# Patient Record
Sex: MALE | Race: WHITE | Employment: STUDENT | URBAN - METROPOLITAN AREA
[De-identification: names, ages, dates, MRNs, and addresses within clinical notes are randomized per-mention and may not be internally consistent; named-entity substitution may affect disease eponyms.]

---

## 2017-03-28 ENCOUNTER — OFFICE VISIT (OUTPATIENT)
Dept: PLASTIC SURGERY | Facility: CLINIC | Age: 22
End: 2017-03-28

## 2017-03-28 VITALS
SYSTOLIC BLOOD PRESSURE: 119 MMHG | TEMPERATURE: 98.4 F | BODY MASS INDEX: 25.86 KG/M2 | HEIGHT: 65 IN | DIASTOLIC BLOOD PRESSURE: 65 MMHG | WEIGHT: 155.2 LBS | HEART RATE: 90 BPM | OXYGEN SATURATION: 99 %

## 2017-03-28 DIAGNOSIS — F64.0 GENDER DYSPHORIA IN ADOLESCENT AND ADULT: Primary | ICD-10-CM

## 2017-03-28 RX ORDER — METHYLPHENIDATE HYDROCHLORIDE 27 MG/1
TABLET ORAL
Status: ON HOLD | COMMUNITY
Start: 2017-01-12 | End: 2018-01-03

## 2017-03-28 RX ORDER — TESTOSTERONE CYPIONATE 200 MG/ML
0.4 INJECTION, SOLUTION INTRAMUSCULAR WEEKLY
COMMUNITY
Start: 2015-08-11

## 2017-03-28 ASSESSMENT — PAIN SCALES - GENERAL: PAINLEVEL: NO PAIN (0)

## 2017-03-28 NOTE — NURSING NOTE
"/65 (BP Location: Left arm, Patient Position: Chair, Cuff Size: Adult Regular)  Pulse 90  Temp 98.4  F (36.9  C)  Ht 5' 5\"  Wt 155 lb 3.2 oz  SpO2 99%  BMI 25.83 kg/m2    Rhina Kelly LPN        "

## 2017-03-28 NOTE — LETTER
"3/28/2017       RE: Inderjit Rider  [address]   [facility] [id number]     Dear Colleague,    Thank you for referring your patient, Inderjit Rider, to the [facility]ON[facility]GERY at [facility]. Please see a copy of my visit note below.    PLASTICS NEW    This is a 21 year old biological female transitioning to male here to discuss possible top surgery.Inderjit prefers male pronouns.  He was referred by previous patients since he can't afford to pay out of pocket.     He has been on testosterone for 3 years and established care with Dr Alysha Corbin, having recently moved here from [facility] in December 2016.   He was seeing his therapist Johnathan in [facility] for about 1.5 years, but this was about a year ago.   He's been \"out\", living his chosen gender role for at least 6 years.  He binds with [employer] for the past 4-5 years.  He denies any breast problems.     PMH: gender dysphoria. Anxiety - no meds. Depression  - on Lexapro, stable. Followed by psychiatrist and PMD. Denies diabetes, asthma, GERD. Has piercings and tattoos, but denies any healing/scarring or bleeding/clotting problems.     PSH: tonsils and adenoids.    FHX: no breast or ovarian cancer. Mom had a lumpectomy but presumably benign. Also has HTN and history of TIA's. Dad's health history unknown. PGM may have had some form of cancer. MGF - diabetets and HTN.     SHX: mom now supportive of transition. Grandma also supportive. Has a 9 year old sister. Currently full-time student at [employer] pursuing liberal arts (classes 2 days per week). Also works at [employer] - does all jobs when not at school. Works part-time in the box office at the [employer]   He lives with roommates who can help with postop cares. He has a boyfriend in [place].   Exercise - weights and running 2 times per week. Diet - vegetarian, some dairy, lots of pasta but trying to cut down on carbs. Admits to not much " "protein other than eggs. Non-smoker, non-drinker. Sleep - averages 4-5 hours per night due to high stress.     PE: 5'5\", 155 lbs. Biological female passes well as male. Breasts fairly symmetric IMFs but low on thorax due to binding. Breast volume slightly larger on right side. Minimal lateral thoracic rolls. Good pectoralis development, anterior chest contour somewhat rounded over manubrium. Fibrofatty tissues without masses on palpation. Benign axillary adenopathy bilateral. Elastic skin and grade 2 ptosis. Photos taken with written consent.    A/P: biologic female transitioning to male. Good candidate for gender affirming bilateral SQ mastectomies with nipple grafts. Will need preop mammogram.     Has Blue Plus insurance. Wants surgery during summer break. Will need to provide letter of support from a therapist, not sure if he will be able to reestablish with his previous therapist or need to see someone new in the [facility]. May also need documentation of stability of mental health diagnoses.     Once we have prior authorization and a surgical date, we will meet again for a preop visit to discuss possible risks and complications as well as periop cares and limitations.    Total time- 30 minutes, greater than half spent discussing surgical options and insurance issues.       Again, thank you for allowing me to participate in the care of your patient.      Sincerely,    Giulia Woods MD      "

## 2017-04-10 NOTE — PROGRESS NOTES
"PLASTICS NEW    This is a 21 year old biological female transitioning to male here to discuss possible top surgery.Inderjit prefers male pronouns.  He was referred by previous patients since he can't afford to pay out of pocket.     He has been on testosterone for 3 years and established care with Dr Alysha Corbin, having recently moved here from [facility] in December 2016.   He was seeing his therapist Johnathan in [facility] for about 1.5 years, but this was about a year ago.   He's been \"out\", living his chosen gender role for at least 6 years.  He binds with [employer] for the past 4-5 years.  He denies any breast problems.     PMH: gender dysphoria. Anxiety - no meds. Depression  - on Lexapro, stable. Followed by psychiatrist and PMD. Denies diabetes, asthma, GERD. Has piercings and tattoos, but denies any healing/scarring or bleeding/clotting problems.     PSH: tonsils and adenoids.    FHX: no breast or ovarian cancer. Mom had a lumpectomy but presumably benign. Also has HTN and history of TIA's. Dad's health history unknown. PGM may have had some form of cancer. MGF - diabetets and HTN.     SHX: mom now supportive of transition. Grandma also supportive. Has a 9 year old sister. Currently full-time student at [employer] pursuing liberal arts (classes 2 days per week). Also works at [employer] - does all jobs when not at school. 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   He lives with roommates who can help with postop cares. He has a boyfriend in [place].   Exercise - weights and running 2 times per week. Diet - vegetarian, some dairy, lots of pasta but trying to cut down on carbs. Admits to not much protein other than eggs. Non-smoker, non-drinker. Sleep - averages 4-5 hours per night due to high stress.     PE: 5'5\", 155 lbs. Biological female passes well as male. Breasts fairly symmetric IMFs but low on thorax due to binding. Breast volume slightly larger on right side. Minimal lateral thoracic rolls. Good " pectoralis development, anterior chest contour somewhat rounded over manubrium. Fibrofatty tissues without masses on palpation. Benign axillary adenopathy bilateral. Elastic skin and grade 2 ptosis. Photos taken with written consent.    A/P: biologic female transitioning to male. Good candidate for gender affirming bilateral SQ mastectomies with nipple grafts. Will need preop mammogram.     Has [employer] Plus insurance. Wants surgery during summer break. Will need to provide letter of support from a therapist, not sure if he will be able to reestablish with his previous therapist or need to see someone new in the [facility]. May also need documentation of stability of mental health diagnoses.     Once we have prior authorization and a surgical date, we will meet again for a preop visit to discuss possible risks and complications as well as periop cares and limitations.    Total time- 30 minutes, greater than half spent discussing surgical options and insurance issues.

## 2017-08-22 DIAGNOSIS — F64.0 GENDER DYSPHORIA IN ADOLESCENT AND ADULT: Primary | ICD-10-CM

## 2018-01-03 ENCOUNTER — ANESTHESIA (OUTPATIENT)
Dept: SURGERY | Facility: CLINIC | Age: 23
End: 2018-01-03
Payer: COMMERCIAL

## 2018-01-03 ENCOUNTER — HOSPITAL ENCOUNTER (OUTPATIENT)
Facility: CLINIC | Age: 23
Discharge: HOME | End: 2018-01-03
Attending: SURGERY | Admitting: SURGERY
Payer: COMMERCIAL

## 2018-01-03 ENCOUNTER — ANESTHESIA EVENT (OUTPATIENT)
Dept: SURGERY | Facility: CLINIC | Age: 23
End: 2018-01-03
Payer: COMMERCIAL

## 2018-01-03 VITALS
DIASTOLIC BLOOD PRESSURE: 75 MMHG | SYSTOLIC BLOOD PRESSURE: 130 MMHG | HEIGHT: 65 IN | WEIGHT: 161.38 LBS | BODY MASS INDEX: 26.89 KG/M2 | TEMPERATURE: 98.9 F | OXYGEN SATURATION: 99 % | RESPIRATION RATE: 16 BRPM

## 2018-01-03 DIAGNOSIS — F64.0 GENDER DYSPHORIA IN ADULT: Primary | ICD-10-CM

## 2018-01-03 LAB
GLUCOSE BLDC GLUCOMTR-MCNC: 97 MG/DL (ref 70–99)
HCG UR QL: NEGATIVE

## 2018-01-03 PROCEDURE — 71000014 ZZH RECOVERY PHASE 1 LEVEL 2 FIRST HR: Performed by: SURGERY

## 2018-01-03 PROCEDURE — 25000128 H RX IP 250 OP 636: Performed by: SURGERY

## 2018-01-03 PROCEDURE — 25000128 H RX IP 250 OP 636: Performed by: ANESTHESIOLOGY

## 2018-01-03 PROCEDURE — 88305 TISSUE EXAM BY PATHOLOGIST: CPT | Performed by: SURGERY

## 2018-01-03 PROCEDURE — 81025 URINE PREGNANCY TEST: CPT | Performed by: ANESTHESIOLOGY

## 2018-01-03 PROCEDURE — 71000027 ZZH RECOVERY PHASE 2 EACH 15 MINS: Performed by: SURGERY

## 2018-01-03 PROCEDURE — 25000125 ZZHC RX 250: Performed by: SURGERY

## 2018-01-03 PROCEDURE — 36000057 ZZH SURGERY LEVEL 3 1ST 30 MIN - UMMC: Performed by: SURGERY

## 2018-01-03 PROCEDURE — 71000015 ZZH RECOVERY PHASE 1 LEVEL 2 EA ADDTL HR: Performed by: SURGERY

## 2018-01-03 PROCEDURE — 25000566 ZZH SEVOFLURANE, EA 15 MIN: Performed by: SURGERY

## 2018-01-03 PROCEDURE — 82962 GLUCOSE BLOOD TEST: CPT

## 2018-01-03 PROCEDURE — 25000125 ZZHC RX 250: Performed by: NURSE ANESTHETIST, CERTIFIED REGISTERED

## 2018-01-03 PROCEDURE — 88305 TISSUE EXAM BY PATHOLOGIST: CPT | Mod: 26 | Performed by: SURGERY

## 2018-01-03 PROCEDURE — 25000128 H RX IP 250 OP 636: Performed by: NURSE ANESTHETIST, CERTIFIED REGISTERED

## 2018-01-03 PROCEDURE — 37000008 ZZH ANESTHESIA TECHNICAL FEE, 1ST 30 MIN: Performed by: SURGERY

## 2018-01-03 PROCEDURE — 27210794 ZZH OR GENERAL SUPPLY STERILE: Performed by: SURGERY

## 2018-01-03 PROCEDURE — 40000170 ZZH STATISTIC PRE-PROCEDURE ASSESSMENT II: Performed by: SURGERY

## 2018-01-03 PROCEDURE — 37000009 ZZH ANESTHESIA TECHNICAL FEE, EACH ADDTL 15 MIN: Performed by: SURGERY

## 2018-01-03 PROCEDURE — 27110038 ZZH RX 271: Performed by: SURGERY

## 2018-01-03 PROCEDURE — 36000059 ZZH SURGERY LEVEL 3 EA 15 ADDTL MIN UMMC: Performed by: SURGERY

## 2018-01-03 RX ORDER — BUPIVACAINE HYDROCHLORIDE 5 MG/ML
INJECTION, SOLUTION PERINEURAL PRN
Status: DISCONTINUED | OUTPATIENT
Start: 2018-01-03 | End: 2018-01-04 | Stop reason: HOSPADM

## 2018-01-03 RX ORDER — NALOXONE HYDROCHLORIDE 0.4 MG/ML
.1-.4 INJECTION, SOLUTION INTRAMUSCULAR; INTRAVENOUS; SUBCUTANEOUS
Status: DISCONTINUED | OUTPATIENT
Start: 2018-01-03 | End: 2018-01-04 | Stop reason: HOSPADM

## 2018-01-03 RX ORDER — ACETAMINOPHEN 325 MG/1
650 TABLET ORAL EVERY 4 HOURS PRN
Qty: 100 TABLET | Refills: 0 | COMMUNITY
Start: 2018-01-03

## 2018-01-03 RX ORDER — FENTANYL CITRATE 50 UG/ML
25-50 INJECTION, SOLUTION INTRAMUSCULAR; INTRAVENOUS EVERY 5 MIN PRN
Status: DISCONTINUED | OUTPATIENT
Start: 2018-01-03 | End: 2018-01-04 | Stop reason: HOSPADM

## 2018-01-03 RX ORDER — SODIUM CHLORIDE, SODIUM LACTATE, POTASSIUM CHLORIDE, CALCIUM CHLORIDE 600; 310; 30; 20 MG/100ML; MG/100ML; MG/100ML; MG/100ML
INJECTION, SOLUTION INTRAVENOUS CONTINUOUS
Status: DISCONTINUED | OUTPATIENT
Start: 2018-01-03 | End: 2018-01-03 | Stop reason: HOSPADM

## 2018-01-03 RX ORDER — CEFAZOLIN SODIUM 2 G/100ML
2 INJECTION, SOLUTION INTRAVENOUS
Status: COMPLETED | OUTPATIENT
Start: 2018-01-03 | End: 2018-01-03

## 2018-01-03 RX ORDER — MEPERIDINE HYDROCHLORIDE 25 MG/ML
12.5 INJECTION INTRAMUSCULAR; INTRAVENOUS; SUBCUTANEOUS
Status: DISCONTINUED | OUTPATIENT
Start: 2018-01-03 | End: 2018-01-04 | Stop reason: HOSPADM

## 2018-01-03 RX ORDER — LIDOCAINE HYDROCHLORIDE 20 MG/ML
INJECTION, SOLUTION INFILTRATION; PERINEURAL PRN
Status: DISCONTINUED | OUTPATIENT
Start: 2018-01-03 | End: 2018-01-03

## 2018-01-03 RX ORDER — PROPOFOL 10 MG/ML
INJECTION, EMULSION INTRAVENOUS PRN
Status: DISCONTINUED | OUTPATIENT
Start: 2018-01-03 | End: 2018-01-03

## 2018-01-03 RX ORDER — AMOXICILLIN 250 MG
1 CAPSULE ORAL 2 TIMES DAILY
Qty: 60 TABLET | Refills: 1 | Status: SHIPPED | OUTPATIENT
Start: 2018-01-03 | End: 2018-03-06

## 2018-01-03 RX ORDER — ONDANSETRON 2 MG/ML
INJECTION INTRAMUSCULAR; INTRAVENOUS PRN
Status: DISCONTINUED | OUTPATIENT
Start: 2018-01-03 | End: 2018-01-03

## 2018-01-03 RX ORDER — LIDOCAINE 40 MG/G
CREAM TOPICAL
Status: DISCONTINUED | OUTPATIENT
Start: 2018-01-03 | End: 2018-01-03 | Stop reason: HOSPADM

## 2018-01-03 RX ORDER — FENTANYL CITRATE 50 UG/ML
INJECTION, SOLUTION INTRAMUSCULAR; INTRAVENOUS PRN
Status: DISCONTINUED | OUTPATIENT
Start: 2018-01-03 | End: 2018-01-03

## 2018-01-03 RX ORDER — AZITHROMYCIN 250 MG/1
TABLET, FILM COATED ORAL
Qty: 6 TABLET | Refills: 0 | Status: SHIPPED | OUTPATIENT
Start: 2018-01-03 | End: 2018-03-06

## 2018-01-03 RX ORDER — NEOSTIGMINE METHYLSULFATE 1 MG/ML
VIAL (ML) INJECTION PRN
Status: DISCONTINUED | OUTPATIENT
Start: 2018-01-03 | End: 2018-01-03

## 2018-01-03 RX ORDER — SODIUM CHLORIDE, SODIUM LACTATE, POTASSIUM CHLORIDE, CALCIUM CHLORIDE 600; 310; 30; 20 MG/100ML; MG/100ML; MG/100ML; MG/100ML
INJECTION, SOLUTION INTRAVENOUS CONTINUOUS
Status: DISCONTINUED | OUTPATIENT
Start: 2018-01-03 | End: 2018-01-04 | Stop reason: HOSPADM

## 2018-01-03 RX ORDER — EPHEDRINE SULFATE 50 MG/ML
INJECTION, SOLUTION INTRAMUSCULAR; INTRAVENOUS; SUBCUTANEOUS PRN
Status: DISCONTINUED | OUTPATIENT
Start: 2018-01-03 | End: 2018-01-03

## 2018-01-03 RX ORDER — ONDANSETRON 2 MG/ML
4 INJECTION INTRAMUSCULAR; INTRAVENOUS EVERY 30 MIN PRN
Status: DISCONTINUED | OUTPATIENT
Start: 2018-01-03 | End: 2018-01-04 | Stop reason: HOSPADM

## 2018-01-03 RX ORDER — CEFAZOLIN SODIUM 1 G/3ML
1 INJECTION, POWDER, FOR SOLUTION INTRAMUSCULAR; INTRAVENOUS SEE ADMIN INSTRUCTIONS
Status: DISCONTINUED | OUTPATIENT
Start: 2018-01-03 | End: 2018-01-03 | Stop reason: HOSPADM

## 2018-01-03 RX ORDER — ONDANSETRON 4 MG/1
4-8 TABLET, ORALLY DISINTEGRATING ORAL EVERY 8 HOURS PRN
Qty: 20 TABLET | Refills: 1 | Status: SHIPPED | OUTPATIENT
Start: 2018-01-03 | End: 2018-03-06

## 2018-01-03 RX ORDER — PROPOFOL 10 MG/ML
INJECTION, EMULSION INTRAVENOUS CONTINUOUS PRN
Status: DISCONTINUED | OUTPATIENT
Start: 2018-01-03 | End: 2018-01-03

## 2018-01-03 RX ORDER — OXYCODONE HYDROCHLORIDE 5 MG/1
5-10 TABLET ORAL EVERY 4 HOURS PRN
Qty: 50 TABLET | Refills: 0 | Status: SHIPPED | OUTPATIENT
Start: 2018-01-03 | End: 2018-03-06

## 2018-01-03 RX ORDER — DEXAMETHASONE SODIUM PHOSPHATE 4 MG/ML
INJECTION, SOLUTION INTRA-ARTICULAR; INTRALESIONAL; INTRAMUSCULAR; INTRAVENOUS; SOFT TISSUE PRN
Status: DISCONTINUED | OUTPATIENT
Start: 2018-01-03 | End: 2018-01-03

## 2018-01-03 RX ORDER — GLYCOPYRROLATE 0.2 MG/ML
INJECTION, SOLUTION INTRAMUSCULAR; INTRAVENOUS PRN
Status: DISCONTINUED | OUTPATIENT
Start: 2018-01-03 | End: 2018-01-03

## 2018-01-03 RX ORDER — ONDANSETRON 4 MG/1
4 TABLET, ORALLY DISINTEGRATING ORAL EVERY 30 MIN PRN
Status: DISCONTINUED | OUTPATIENT
Start: 2018-01-03 | End: 2018-01-04 | Stop reason: HOSPADM

## 2018-01-03 RX ORDER — HYDROXYZINE HYDROCHLORIDE 25 MG/1
25-50 TABLET, FILM COATED ORAL EVERY 6 HOURS PRN
Qty: 60 TABLET | Refills: 1 | Status: SHIPPED | OUTPATIENT
Start: 2018-01-03 | End: 2018-03-06

## 2018-01-03 RX ADMIN — FENTANYL CITRATE 50 MCG: 50 INJECTION, SOLUTION INTRAMUSCULAR; INTRAVENOUS at 18:30

## 2018-01-03 RX ADMIN — ONDANSETRON 4 MG: 2 INJECTION INTRAMUSCULAR; INTRAVENOUS at 17:59

## 2018-01-03 RX ADMIN — SODIUM CHLORIDE, POTASSIUM CHLORIDE, SODIUM LACTATE AND CALCIUM CHLORIDE: 600; 310; 30; 20 INJECTION, SOLUTION INTRAVENOUS at 17:59

## 2018-01-03 RX ADMIN — DEXAMETHASONE SODIUM PHOSPHATE 6 MG: 4 INJECTION, SOLUTION INTRAMUSCULAR; INTRAVENOUS at 13:34

## 2018-01-03 RX ADMIN — CEFAZOLIN SODIUM 1 G: 2 INJECTION, SOLUTION INTRAVENOUS at 15:40

## 2018-01-03 RX ADMIN — FENTANYL CITRATE 25 MCG: 50 INJECTION, SOLUTION INTRAMUSCULAR; INTRAVENOUS at 16:25

## 2018-01-03 RX ADMIN — PROPOFOL 200 MG: 10 INJECTION, EMULSION INTRAVENOUS at 13:19

## 2018-01-03 RX ADMIN — FENTANYL CITRATE 50 MCG: 50 INJECTION, SOLUTION INTRAMUSCULAR; INTRAVENOUS at 18:28

## 2018-01-03 RX ADMIN — HYDROMORPHONE HYDROCHLORIDE 0.5 MG: 1 INJECTION, SOLUTION INTRAMUSCULAR; INTRAVENOUS; SUBCUTANEOUS at 13:56

## 2018-01-03 RX ADMIN — ROCURONIUM BROMIDE 50 MG: 10 INJECTION INTRAVENOUS at 13:19

## 2018-01-03 RX ADMIN — FENTANYL CITRATE 50 MCG: 50 INJECTION, SOLUTION INTRAMUSCULAR; INTRAVENOUS at 18:53

## 2018-01-03 RX ADMIN — Medication 1 TABLET: at 20:39

## 2018-01-03 RX ADMIN — HYDROMORPHONE HYDROCHLORIDE 0.3 MG: 1 INJECTION, SOLUTION INTRAMUSCULAR; INTRAVENOUS; SUBCUTANEOUS at 16:25

## 2018-01-03 RX ADMIN — ONDANSETRON 4 MG: 2 INJECTION INTRAMUSCULAR; INTRAVENOUS at 19:47

## 2018-01-03 RX ADMIN — SODIUM CHLORIDE, POTASSIUM CHLORIDE, SODIUM LACTATE AND CALCIUM CHLORIDE: 600; 310; 30; 20 INJECTION, SOLUTION INTRAVENOUS at 13:11

## 2018-01-03 RX ADMIN — MIDAZOLAM 2 MG: 1 INJECTION INTRAMUSCULAR; INTRAVENOUS at 13:06

## 2018-01-03 RX ADMIN — Medication 3.5 MG: at 18:00

## 2018-01-03 RX ADMIN — Medication 0.5 MG: at 18:00

## 2018-01-03 RX ADMIN — CEFAZOLIN SODIUM 2 G: 2 INJECTION, SOLUTION INTRAVENOUS at 13:40

## 2018-01-03 RX ADMIN — FENTANYL CITRATE 100 MCG: 50 INJECTION, SOLUTION INTRAMUSCULAR; INTRAVENOUS at 13:19

## 2018-01-03 RX ADMIN — MIDAZOLAM 2 MG: 1 INJECTION INTRAMUSCULAR; INTRAVENOUS at 13:13

## 2018-01-03 RX ADMIN — Medication 0.2 MG: at 19:21

## 2018-01-03 RX ADMIN — PROPOFOL 30 MCG/KG/MIN: 10 INJECTION, EMULSION INTRAVENOUS at 13:31

## 2018-01-03 RX ADMIN — SODIUM CHLORIDE, POTASSIUM CHLORIDE, SODIUM LACTATE AND CALCIUM CHLORIDE: 600; 310; 30; 20 INJECTION, SOLUTION INTRAVENOUS at 15:38

## 2018-01-03 RX ADMIN — HYDROMORPHONE HYDROCHLORIDE 0.2 MG: 1 INJECTION, SOLUTION INTRAMUSCULAR; INTRAVENOUS; SUBCUTANEOUS at 17:05

## 2018-01-03 RX ADMIN — Medication 5 MG: at 16:05

## 2018-01-03 RX ADMIN — LIDOCAINE HYDROCHLORIDE 40 MG: 20 INJECTION, SOLUTION INFILTRATION; PERINEURAL at 13:19

## 2018-01-03 ASSESSMENT — LIFESTYLE VARIABLES: TOBACCO_USE: 0

## 2018-01-03 ASSESSMENT — ENCOUNTER SYMPTOMS: SEIZURES: 0

## 2018-01-03 NOTE — ANESTHESIA PREPROCEDURE EVALUATION
Anesthesia Evaluation     . Pt has had prior anesthetic. Type: General    History of anesthetic complications   - PONV  minimal PONV      ROS/MED HX    ENT/Pulmonary:  - neg pulmonary ROS    (-) tobacco use, asthma and sleep apnea   Neurologic:  - neg neurologic ROS    (-) seizures   Cardiovascular:  - neg cardiovascular ROS       METS/Exercise Tolerance:     Hematologic:  - neg hematologic  ROS       Musculoskeletal:  - neg musculoskeletal ROS       GI/Hepatic:  - neg GI/hepatic ROS       Renal/Genitourinary:  - ROS Renal section negative       Endo:  - neg endo ROS       Psychiatric:  - neg psychiatric ROS       Infectious Disease:  - neg infectious disease ROS       Malignancy:         Other:                     Physical Exam      Airway   Mallampati: II  TM distance: >3 FB  Neck ROM: full    Dental   (+) chipped  Comment: Multiple chipped. Nothing loose.    Cardiovascular       Pulmonary                     Anesthesia Plan      History & Physical Review  History and physical reviewed and following examination; no interval change.    ASA Status:  2 .    NPO Status:  > 2 hours and > 8 hours    Plan for General and ETT with Intravenous and Propofol induction.          Postoperative Care      Consents  Anesthetic plan, risks, benefits and alternatives discussed with:  Patient and Parent (Mother and/or Father)..                          .

## 2018-01-03 NOTE — IP AVS SNAPSHOT
[place] [id number] [facility] [address]    Phone:  [phone]                                       After Visit Summary   1/3/2018    Inderjit Rider    MRN: [id number]           After Visit Summary Signature Page     I have received my discharge instructions, and my questions have been answered. I have discussed any challenges I see with this plan with the nurse or doctor.    ..........................................................................................................................................  Patient/Patient Representative Signature      ..........................................................................................................................................  Patient Representative Print Name and Relationship to Patient    ..................................................               ................................................  Date                                            Time    ..........................................................................................................................................  Reviewed by Signature/Title    ...................................................              ..............................................  Date                                                            Time

## 2018-01-03 NOTE — IP AVS SNAPSHOT
MRN:[id number]                      After Visit Summary   1/3/2018    Inderjit Rider    MRN: [id number]           Thank you!     Thank you for choosing [facility] for your care. Our goal is always to provide you with excellent care. Hearing back from our patients is one way we can continue to improve our services. Please take a few minutes to complete the written survey that you may receive in the mail after you visit with us. Thank you!        Patient Information     Date Of Birth          1995        About your hospital stay     You were admitted on:  January 3, 2018 You last received care in the:  [facility] PACU    You were discharged on:  January 3, 2018        Reason for your hospital stay       S/p bilateral subcutaneous mastectomies with nipple grafts. OnQ pain catheters.  Tolerated under GA.  OK to dc home per anesthesia criteria.                  Who to Call     For medical emergencies, please call 911.  For non-urgent questions about your medical care, please call your primary care provider or clinic, [phone]  For questions related to your surgery, please call your surgery clinic        Attending Provider     Provider Giulia Washington MD Plastic Surgery       Primary Care Provider Office Phone # Fax #    [facility] [phone]       When to contact your care team       Call Plastic Surgery Clinic during daytime hours (Susana: [phone], OR Lori: [phone]), OR the hospital  for nights/ weekends ([phone]) to speak with plastic surgery on-call resident IF you have any of the following: temperature >102.5, increased bleeding noted in drains or under skin, reactions to meds, reactions to pain pump (tingling around lips or ringing in ears), any problems with drains/pain catheters/or dressings.                  After Care Instructions     Activity       Your activity upon discharge: no heavy lifting > 5 lbs x 3 weeks.  "AVOID excessive/ extreme use of upper body/ extremities x 3 weeks. OK to do gentle movements for daily cares.  AVOID direct trauma to surgical sites.  OK to sleep on your back or modified side position (may be uncomfortable with incisions and drains on the side). CANNOT sleep on stomach for at least the first 2 weeks.  May want to consider sleeping with pillows to prevent from rolling over.   Some patients prefer to sleep in a recliner to prevent rolling, but elevation is not required.            Diet       Follow this diet upon discharge: Advance to a regular diet as tolerated.  Drink plenty of fluids to help prevent constipation.  Get plenty of protein, vitamins and minerals (fruits and veggies)  to help with healing.  Can resume usual preop meds and supplements as tolerated.            Monitor and record       SMITHA drain output EVERY morning and EVERY night.  Usually between 30-50 mls per day, but don't be alarmed is more or less. May not be equal between sides. Will probably drop off when pain pump is empty.  Usually fluid looks like cherry Koolaid at first, then Hawaiian punch color, then peach tea over time.  May notice protein \"clots\" that look like \"worms\" in the tubing. Do not be alarmed -this is just precipitated protein from the fluid that is weeping from your raw tissues, much like what you see when you scrape your knee.  We DO want to be notified IF: there is increasing amount of fresh bright red blood that rapidly fills the suction bulb after emptying. OR if blood collecting under the skin and causing significant painful swelling on one side (expanding hematoma).            Supplies       List the supplies the pt needs to go home:  PLEASE send supplies for SMITHA drain cares.  Please instruct caregivers on drain cares.  Please send home with spare ACE wraps from OR.  THANK YOU            Tubes and drains       You are going home with the following tubes or drains: SMITHA.  Follow these instructions  to care for " your tube.  STRIP tubing and MEASURE/RECORD output Qam & Qpm.  Please bring output record to follow up appointment.  MAKE SURE NURSES INSTRUCT ON DRAIN CARES BEFORE GOING HOME.            Wound care and dressings       Instructions to care for your wound at home: as directed.  Keep nipple graft dressings DRY. NO SHOWERS until after follow up appointment.   OK to rewrap ACE if too tight or too loose.   Do NOT mess with dressings underneath ACE wrap or nipple grafts dressings.                  Follow-up Appointments     Follow Up and recommended labs and tests       Follow up with me,  Shawnee Kennedy, within 1 week at [address] [place] [address]. to evaluate after surgery.  No follow up labs or test are needed.                  Your next 10 appointments already scheduled     Jan 09, 2018 10:00 AM CST   (Arrive by 9:45 AM)   Post-Op with Giulia Woods MD   [facility] Plastic and Reconstructive Surgery ([facility] [place])    [address] [place] [address]  4th [facility] [place] [facility] [id number] [phone]              Further instructions from your care team       [facility]  Same-Day Surgery   Adult Discharge Orders & Instructions     For 24 hours after surgery    1. Get plenty of rest.  A responsible adult must stay with you for at least 24 hours after you leave the hospital.   2. Do not drive or use heavy equipment.  If you have weakness or tingling, don't drive or use heavy equipment until this feeling goes away.  3. Do not drink alcohol.  4. Avoid strenuous or risky activities.  Ask for help when climbing stairs.   5. You may feel lightheaded.  IF so, sit for a few minutes before standing.  Have someone help you get up.   6. If you have nausea (feel sick to your stomach): Drink only clear liquids such as apple juice, ginger ale, broth or 7-Up.  Rest may also help.  Be sure to drink enough fluids.  Move to a regular diet as you feel able.  7. You may have a slight  fever. Call the doctor if your fever is over 100 F (37.7 C) (taken under the tongue) or lasts longer than 24 hours.  8. You may have a dry mouth, a sore throat, muscle aches or trouble sleeping.  These should go away after 24 hours.  9. Do not make important or legal decisions.   Call your doctor for any of the followin.  Signs of infection (fever, growing tenderness at the surgery site, a large amount of drainage or bleeding, severe pain, foul-smelling drainage, redness, swelling).    2. It has been over 8 to 10 hours since surgery and you are still not able to urinate (pass water).    3.  Headache for over 24 hours.    4.  Numbness, tingling or weakness the day after surgery (if you had spinal anesthesia).  To contact a doctor, call ________________________________________ or:        [phone] and ask for the resident on call for   ______________________________________________ (answered 24 hours a day)      Emergency Department:    [facility]: [phone]       (TTY for hearing impaired: [phone])    [facility]: [phone]       (TTY for hearing impaired: [phone])  Caring for Your Naun-Avalos Drain    You have been discharged with a Naun-Avalos drainage tube. This tube drains fluid from your incision, helping prevent swelling and reducing the risk for infection. The tube is held in place by a few stitches. The drain will be removed when your doctor determines you no longer need it and when the amount of drainage decreases. The color and amount of fluid varies. Right after surgery, the fluid may be bright red and may become clearer over time.   Dressing:    Keep the skin around the tube dry.    If you have a dressing, change it every day.   o Wash your hands.  o Remove the old bandage. Do not use scissors-you may accidentally cut the tube.  o Check for any redness, swelling, drainage, or broken stitches. (Call your doctor with any of these findings).  o Wash your hands  again.  o Wet a cotton swab (Q-tip) and clean around the incision and the tube site. Use normal saline solution (salt and water) or soap and water. Start at the tube site and move outward in a circular motion.   o Pat dry.  o Put a new bandage on the incision and tube site. Make the bandage large enough to cover the whole incision area.   o Tape the bandage in place.  o Throw out old materials and wash your hands.   Home Care:    Tape the tube to the skin below the bandage. Make sure to keep some slack in the tube to keep it from pulling out.     DO NOT sleep on the same side as the tube.    Secure the tube and bulb inside your clothing with a safety pin. This helps keep the tube from being pulled out.     Keep the bulb compressed at all times, except when you empty it.    Empty your drain at least twice a day. Empty it more often if the drain is full.   o Lift the opening of the drain.  o Drain the fluid into a measuring cup.  o Record the amount of fluid each time you empty. Share the information with your doctor at your follow-up visit.   o Squeeze the bulb with your hands until you hear air coming out of the bulb.  o Close the opening.     Tape plastic wrap over the bandage and tube site when you shower.      Stripping  the tube helps keep blood clots from blocking the tube.                         ONLY DO THIS IF YOUR MD INSTRUCTED YOU TO DO SO!  o Hold the tubing where it leaves the skin with one hand. This keeps it from pulling on the skin.  o Pinch the tubing with the thumb and first finger of your other hand.   o Slowly and firmly pull your thumb and first finger down the tube (squeezing the tube between your fingers). Keep squeezing the tube as you run your fingers towards the bulb. If the pulling hurts or feels like it is coming out of the skin, STOP. Begin again more gently.  o Let go of the tubing with both hands. If the tube is still blocked, repeat these steps three or four times. Make sure that the  bulb is compressed so it creates suction.  When to call your doctor:    New or increased pain around the tube    Redness, warmth, or swelling around the incision or tube    Drainage that is foul smelling    Vomiting    Fever over 101 F degrees    Fluid leaking around the tube    Incision seems not to be healing    Stitches become loose    The tube falls out    Drainage that changes from light pick to dark red    A sudden increase or decrease in the amount of drainage (over 30 ml).  Your drainage record:  Date Time Bulb 1: Amount of drainage (ml or cc) Bulb 2: Amount of drainage (ml or cc) Notes                                                                                            Rev. 4/2014  ON-Q  C-bloc Continuous Nerve Block Discharge Instructions  The Nerve Block:      Your anesthesiologist performed a nerve block (a procedure that blocks pain to only a specific area) by inserting a small tube in your body.  This tube is connected to a pump that will help control your pain.    The pump is shaped like a balloon and is filled with medicine that causes numbness or loss of sensation to help control your pain around the incision or site of injury.  The pain pump DOES NOT contain controlled substances.      The medicine in the pump may alter your ability to feel changes in temperature or pressure. Your doctor will tell you if any special precautions apply to you.       The Pump      DO NOT SQUEEZE THE PUMP.     The pump delivers medicine at a very slow rate.    You will NOT see the medicine moving through the tubing.    As the medicine is delivered, the pump ball will slowly become smaller.    It may take a day or so before you notice a change in the size and look of the pump.    Depending on the size of your pump, it may take 2 - 5 days to give all the medicine.    The middle part of the pump may look like an apple core when empty.  Managing Your Pain  The Medicine and Infusion Rate:   4 ml per hour           The  continuous nerve block infusion may not block all of the pain from your surgery so it is important that you take the pain medicines prescribed by your surgeon if you need them.      If you continue to have difficulty with your pain control, please page or call the anesthesiologist.  Caring for Your Pump at Home    Wear the pump on the outside of clothing - away from your skin and cold therapy (ice packs).  The delivery rate is accurate only at room temperature.    Make sure the white clamp on the tubing remains open (moves freely on the tubing).    Make sure there are no kinks in the tubing.    Do not tape or cover up the filter.    Protect the pump from sunlight and heat.    When sleeping:   o Do not place the pump underneath the bed covers where the pump may become too warm.  o Do not place the pump on the floor or hang the pump on a bed post as these situations may cause the tubing to get tangled and get pulled out.    Bathing/Showering:    o We recommend taking sponge baths until the pump is removed.  o It is important to not get the area where the tube enters your body wet.    It is normal for a small amount of fluid to leak from the hole in your body where the tube is inserted.  If this occurs, reinforce the bandage with another bandage.  The Infusion    Do not turn the infusion off unless your anesthesiologist has told you to do so.    Do not change the flow rate of the infusion unless your anesthesiologist told you to do so.  Changing the flow rate without your doctor s instruction may result in the wrong dose of medicine, which could cause serious injury.    If your anesthesiologist told you to change the rate of your infusion:  o Flip open the clear cover.  o Turn the white key on the select-a-flow dial to the instructed rate.  (The rate of the infusion should line up with the black arrow at the top of the controller.)    o Listen for the click when you move the dial.  Removing the Tubing    When the pump  is empty or if you have been told to stop the infusion you can remove the tubing.  Follow these steps:  o Wash your hands.  o Clamp the tubing (squeeze the white clamp until you hear or feel a click).  o Remove the clear dressing that covers the tubing.  o Grasp the tubing close to the skin and gently pull.  If you meet resistance, stop pulling and page or call your anesthesiologist.  o DO NOT cut or forcefully remove the tubing.  o After removal, check the end of the tubing for a dark tip.  If you do not see a dark tip, page or call your anesthesiologist.  o Apply firm pressure over the site until oozing stops.  Wash the area with soap and water, dry with a clean towel and then cover with a bandage.      The pump is not reusable. Dispose of it in the trash and wash your hands.   Troubleshooting    Tubing Comes Out From Skin:  If the tube accidentally comes out, check the end of the tube for a dark tip.    If you see a dark tip simply discard it and use the pain pills prescribed to you by your surgeon.    If you don t see a dark tip, page or call the anesthesiologist.    Tubing Disconnection:  If the tubing accidentally becomes disconnected from the pump, DO NOT reconnect the pump to the tubing.  It may have been contaminated with germs.  Close the tubing clamp and immediately page or call your anesthesiologist.    Fluid Leaking:  If enough fluid is leaking from the pump or the tubing outside your body to soak through the dressing, close the white clamp on the tubing and page or call your anesthesiologist.    Immediately report the following to your anesthesiologist:    Redness, warmth, swelling, or tenderness at the site the tubing was inserted    Increase in pain    Fever, chills, sweats    Bowel or bladder changes    Difficulty breathing    Dizziness, lightheadedness    Blurred vision    Ringing or buzzing in your ears    Metal taste in your mouth    Numbness and/or tingling around your mouth, fingers or  "toes    Drowsiness    Confusion    Trouble removing the tubing    Dark tip is not present when tubing is removed         Notifying your Anesthesiologist  o Page:  Dial [phone], then enter [phone].  You will be prompted to enter your phone number and then the # sign.  The anesthesiologist will call you back.  o Call:  Dial [phone].  Ask to speak to the anesthesiologist on call for the [facility].       Pending Results     No orders found from 1/1/2018 to 1/4/2018.            Admission Information     Date & Time Provider Department Dept. Phone    1/3/2018 Giulia Woods MD [facility] PACU [phone]      Your Vitals Were     Blood Pressure Temperature Respirations Height Weight Pulse Oximetry    125/78 97.5  F (36.4  C) (Axillary) 14 1.651 m (5' 5\") 73.2 kg (161 lb 6 oz) 100%    BMI (Body Mass Index)                   26.85 kg/m2           How do you roll? Information     How do you roll? gives you secure access to your electronic health record. If you see a primary care provider, you can also send messages to your care team and make appointments. If you have questions, please call your primary care clinic.  If you do not have a primary care provider, please call [phone] and they will assist you.        Care EveryWhere ID     This is your Care EveryWhere ID. This could be used by other organizations to access your [facility] medical records  [id number]        Equal Access to Services     ROB GASTON AH: Hadii aad ku hadasho Soguillerminaali, waaxda luqadaha, qaybta kaalmada adeegyada, carey wong . So [facility] [phone].    ATENCIÓN: Si habla español, tiene a courtney disposición servicios gratuitos de asistencia lingüística. Llame al [phone].    We comply with applicable federal civil rights laws and [place] laws. We do not discriminate on the basis of race, color, national origin, age, disability, sex, sexual orientation, or gender identity.               Review of your " medicines      START taking        Dose / Directions    acetaminophen 325 MG tablet   Commonly known as:  TYLENOL   Used for:  Gender dysphoria in adult        Dose:  650 mg   Take 2 tablets (650 mg) by mouth every 4 hours as needed for mild pain   Quantity:  100 tablet   Refills:  0       azithromycin 250 MG tablet   Commonly known as:  ZITHROMAX   Used for:  Gender dysphoria in adult        Two tablets first day, then one tablet daily for four days.   Quantity:  6 tablet   Refills:  0       hydrOXYzine 25 MG tablet   Commonly known as:  ATARAX   Used for:  Gender dysphoria in adult        Dose:  25-50 mg   Take 1-2 tablets (25-50 mg) by mouth every 6 hours as needed for itching Do not use benadryl while taking this medication   Quantity:  60 tablet   Refills:  1       ondansetron 4 MG ODT tab   Commonly known as:  ZOFRAN ODT   Used for:  Gender dysphoria in adult        Dose:  4-8 mg   Take 1-2 tablets (4-8 mg) by mouth every 8 hours as needed for nausea   Quantity:  20 tablet   Refills:  1       oxyCODONE IR 5 MG tablet   Commonly known as:  ROXICODONE   Used for:  Gender dysphoria in adult        Dose:  5-10 mg   Take 1-2 tablets (5-10 mg) by mouth every 4 hours as needed for pain maximum 12 tablet(s) per day   Quantity:  50 tablet   Refills:  0       senna-docusate 8.6-50 MG per tablet   Commonly known as:  SENOKOT-S;PERICOLACE   Used for:  Gender dysphoria in adult        Dose:  1 tablet   Take 1 tablet by mouth 2 times daily Take while using narcotic pain medication to prevent constipation   Quantity:  60 tablet   Refills:  1         CONTINUE these medicines which have NOT CHANGED        Dose / Directions    LEXAPRO PO        Dose:  30 mg   Take 30 mg by mouth At Bedtime   Refills:  0       testosterone cypionate 200 MG/ML injection   Commonly known as:  DEPOTESTOTERONE        Dose:  0.4 mL   Inject 0.4 mLs into the muscle once a week   Refills:  0            Where to get your medicines      These medications  were sent to [facility] [place], [place] - [address]  [address], [facility] [address]     Phone:  [phone]     azithromycin 250 MG tablet    hydrOXYzine 25 MG tablet    ondansetron 4 MG ODT tab    senna-docusate 8.6-50 MG per tablet         Some of these will need a paper prescription and others can be bought over the counter. Ask your nurse if you have questions.     Bring a paper prescription for each of these medications     oxyCODONE IR 5 MG tablet       You don't need a prescription for these medications     acetaminophen 325 MG tablet               ANTIBIOTIC INSTRUCTION     You've Been Prescribed an Antibiotic - Now What?  Your healthcare team thinks that you or your loved one might have an infection. Some infections can be treated with antibiotics, which are powerful, life-saving drugs. Like all medications, antibiotics have side effects and should only be used when necessary. There are some important things you should know about your antibiotic treatment.      Your healthcare team may run tests before you start taking an antibiotic.    Your team may take samples (e.g., from your blood, urine or other areas) to run tests to look for bacteria. These test can be important to determine if you need an antibiotic at all and, if you do, which antibiotic will work best.      Within a few days, your healthcare team might change or even stop your antibiotic.    Your team may start you on an antibiotic while they are working to find out what is making you sick.    Your team might change your antibiotic because test results show that a different antibiotic would be better to treat your infection.    In some cases, once your team has more information, they learn that you do not need an antibiotic at all. They may find out that you don't have an infection, or that the antibiotic you're taking won't work against your infection. For example, an infection caused by a virus can't be  treated with antibiotics. Staying on an antibiotic when you don't need it is more likely to be harmful than helpful.      You may experience side effects from your antibiotic.    Like all medications, antibiotics have side effects. Some of these can be serious.    Let you healthcare team know if you have any known allergies when you are admitted to the hospital.    One significant side effect of nearly all antibiotics is the risk of severe and sometimes deadly diarrhea caused by Clostridium difficile (C. Difficile). This occurs when a person takes antibiotics because some good germs are destroyed. Antibiotic use allows C. diificile to take over, putting patients at high risk for this serious infection.    As a patient or caregiver, it is important to understand your or your loved one's antibiotic treatment. It is especially important for caregivers to speak up when patients can't speak for themselves. Here are some important questions to ask your healthcare team.    What infection is this antibiotic treating and how do you know I have that infection?    What side effects might occur from this antibiotic?    How long will I need to take this antibiotic?    Is it safe to take this antibiotic with other medications or supplements (e.g., vitamins) that I am taking?     Are there any special directions I need to know about taking this antibiotic? For example, should I take it with food?    How will I be monitored to know whether my infection is responding to the antibiotic?    What tests may help to make sure the right antibiotic is prescribed for me?      Information provided by:  www.cdc.gov/getsmart  U.S. Department of Health and Human Services  Centers for disease Control and Prevention  National Center for Emerging and Zoonotic Infectious Diseases  Division of Healthcare Quality Promotion         Protect others around you: Learn how to safely use, store and throw away your medicines at www.disposemymeds.org.              Medication List: This is a list of all your medications and when to take them. Check marks below indicate your daily home schedule. Keep this list as a reference.      Medications           Morning Afternoon Evening Bedtime As Needed    acetaminophen 325 MG tablet   Commonly known as:  TYLENOL   Take 2 tablets (650 mg) by mouth every 4 hours as needed for mild pain                                azithromycin 250 MG tablet   Commonly known as:  ZITHROMAX   Two tablets first day, then one tablet daily for four days.                                hydrOXYzine 25 MG tablet   Commonly known as:  ATARAX   Take 1-2 tablets (25-50 mg) by mouth every 6 hours as needed for itching Do not use benadryl while taking this medication                                LEXAPRO PO   Take 30 mg by mouth At Bedtime                                ondansetron 4 MG ODT tab   Commonly known as:  ZOFRAN ODT   Take 1-2 tablets (4-8 mg) by mouth every 8 hours as needed for nausea                                oxyCODONE IR 5 MG tablet   Commonly known as:  ROXICODONE   Take 1-2 tablets (5-10 mg) by mouth every 4 hours as needed for pain maximum 12 tablet(s) per day                                senna-docusate 8.6-50 MG per tablet   Commonly known as:  SENOKOT-S;PERICOLACE   Take 1 tablet by mouth 2 times daily Take while using narcotic pain medication to prevent constipation                                testosterone cypionate 200 MG/ML injection   Commonly known as:  DEPOTESTOTERONE   Inject 0.4 mLs into the muscle once a week

## 2018-01-04 NOTE — OR NURSING
Pt's HR elevated into 100-110s pt c/o nausea, offered compazine, pt declined due to potential for feeling sleepy and needing to go up about 20 steps to get home. This RN informed Dr Roblero of pt's HR and declining medication offered and Dr Roblero came to bedside at 2200. Dr Roblero did have a discussion with the pt and family. It was agreed upon that the pt would have the last approximately 200 of IV fluid and we would check back in 15-20 minutes to see how he was feeling at that point. 2230Dr Kellert bedside.  pt had received the 200 bolus and was feeling better, HR remained in the 100s and pt stated that it could be anxiety related. Pt stated their anxiety increases when pt is around pt's mother. Dr Roblero stated it was ok for pt to discharge home. Pt did assist with getting dressed and upon movement did have an emesis. Pt after stated felt better and wanted to continue home. Pt was steady on feet and able to walk well, pain was comfortable to tolerable, no c/o urge to void at this time. No further questions on discharge instructions. Pt discharged home with family at 2253.

## 2018-01-04 NOTE — ANESTHESIA CARE TRANSFER NOTE
Patient: Inderjit Rider    Procedure(s):  Bilateral Subcutaneous Mastectomy With Nipple Grafts. OnQ - Wound Class: I-Clean    Diagnosis: Transgender/Gender Dysphoria  Diagnosis Additional Information: No value filed.    Anesthesia Type:   General, ETT     Note:  Airway :Face Mask  Patient transferred to:PACU  Comments: Pt to PACU, VSS.  Report to RN, questions answered. Handoff Report: Identifed the Patient, Identified the Reponsible Provider, Reviewed the pertinent medical history, Discussed the surgical course, Reviewed Intra-OP anesthesia mangement and issues during anesthesia, Set expectations for post-procedure period and Allowed opportunity for questions and acknowledgement of understanding      Vitals: (Last set prior to Anesthesia Care Transfer)    CRNA VITALS  1/3/2018 1752 - 1/3/2018 1827      1/3/2018             Pulse: 120    SpO2: 100 %                Electronically Signed By: VIVIAN Iyer CRNA  January 3, 2018  6:27 PM

## 2018-01-04 NOTE — BRIEF OP NOTE
[facility], [place]    Brief Operative Note    Pre-operative diagnosis: Transgender/Gender Dysphoria  Post-operative diagnosis same  Procedure: Procedure(s):  Bilateral Subcutaneous Mastectomy With Nipple Grafts. OnQ - Wound Class: I-Clean  Surgeon: Surgeon(s) and Role:     * Giulia Woods MD - Primary  Anesthesia: General   Estimated blood loss: Less than 100 ml  Drains: Naun-Avalos x2 & On-Q  Specimens:   ID Type Source Tests Collected by Time Destination   A : LEFT BREAST Tissue Breast, Left SURGICAL PATHOLOGY EXAM Giulia Woods MD 1/3/2018 12:16 PM    B : RIGHT BREAST Tissue Breast, Right SURGICAL PATHOLOGY EXAM Giulia Woods MD 1/3/2018 12:16 PM      Findings:   None.  Complications: None.  Implants: None.    Only 100cc of urine after 2L of fluid. Keep ashraf post op and remove when UOP increasing. Anesthesia to monitor in PACU.

## 2018-01-04 NOTE — ANESTHESIA POSTPROCEDURE EVALUATION
Patient: Inderjit Rider    Procedure(s):  Bilateral Subcutaneous Mastectomy With Nipple Grafts. OnQ - Wound Class: I-Clean    Diagnosis:Transgender/Gender Dysphoria  Diagnosis Additional Information: No value filed.    Anesthesia Type:  General, ETT    Note:  Anesthesia Post Evaluation    Patient location during evaluation: PACU  Patient participation: Able to fully participate in evaluation  Level of consciousness: awake and alert  Pain management: adequate  Airway patency: patent  Cardiovascular status: acceptable  Respiratory status: acceptable  Hydration status: balanced  PONV: none     Anesthetic complications: None    Comments: Pt with nausea a vomiting.  Treated with zofran total 8 mg IV.  Pt refused compazine, Mother stated it would make him too sleepy to climb the stairs to his home.  Pt also with minimal tachycardia 100-112 consistently.  Pt was given a 200 ml fluid bolus.  Pt nausea dissipated with vomiting.  I let the pt relax for 20 minutes then met with him again.  At this time no nausea.  Minimal pain that is tolerable.  Minimal tachycardia.  Pt would like to go home.           Last vitals:  Vitals:    01/03/18 2030 01/03/18 2100 01/03/18 2130   BP: 124/81 124/83 120/78   Resp: 16 12 14   Temp:  36.5  C (97.7  F)    SpO2: 98% 98% 98%         Electronically Signed By: Gloria Roblero MD  January 3, 2018  10:29 PM

## 2018-01-04 NOTE — DISCHARGE INSTRUCTIONS
[facility]  Same-Day Surgery   Adult Discharge Orders & Instructions     For 24 hours after surgery    1. Get plenty of rest.  A responsible adult must stay with you for at least 24 hours after you leave the hospital.   2. Do not drive or use heavy equipment.  If you have weakness or tingling, don't drive or use heavy equipment until this feeling goes away.  3. Do not drink alcohol.  4. Avoid strenuous or risky activities.  Ask for help when climbing stairs.   5. You may feel lightheaded.  IF so, sit for a few minutes before standing.  Have someone help you get up.   6. If you have nausea (feel sick to your stomach): Drink only clear liquids such as apple juice, ginger ale, broth or 7-Up.  Rest may also help.  Be sure to drink enough fluids.  Move to a regular diet as you feel able.  7. You may have a slight fever. Call the doctor if your fever is over 100 F (37.7 C) (taken under the tongue) or lasts longer than 24 hours.  8. You may have a dry mouth, a sore throat, muscle aches or trouble sleeping.  These should go away after 24 hours.  9. Do not make important or legal decisions.   Call your doctor for any of the followin.  Signs of infection (fever, growing tenderness at the surgery site, a large amount of drainage or bleeding, severe pain, foul-smelling drainage, redness, swelling).    2. It has been over 8 to 10 hours since surgery and you are still not able to urinate (pass water).    3.  Headache for over 24 hours.    4.  Numbness, tingling or weakness the day after surgery (if you had spinal anesthesia).  To contact a doctor, call ________________________________________ or:        [phone] and ask for the resident on call for   ______________________________________________ (answered 24 hours a day)      Emergency Department:    [facility]: [phone]       (TTY for hearing impaired: [phone])    [facility]: [phone]       (TTY for  hearing impaired: [phone])  Caring for Your Naun-Avalos Drain    You have been discharged with a Naun-Avalos drainage tube. This tube drains fluid from your incision, helping prevent swelling and reducing the risk for infection. The tube is held in place by a few stitches. The drain will be removed when your doctor determines you no longer need it and when the amount of drainage decreases. The color and amount of fluid varies. Right after surgery, the fluid may be bright red and may become clearer over time.   Dressing:    Keep the skin around the tube dry.    If you have a dressing, change it every day.   o Wash your hands.  o Remove the old bandage. Do not use scissors-you may accidentally cut the tube.  o Check for any redness, swelling, drainage, or broken stitches. (Call your doctor with any of these findings).  o Wash your hands again.  o Wet a cotton swab (Q-tip) and clean around the incision and the tube site. Use normal saline solution (salt and water) or soap and water. Start at the tube site and move outward in a circular motion.   o Pat dry.  o Put a new bandage on the incision and tube site. Make the bandage large enough to cover the whole incision area.   o Tape the bandage in place.  o Throw out old materials and wash your hands.   Home Care:    Tape the tube to the skin below the bandage. Make sure to keep some slack in the tube to keep it from pulling out.     DO NOT sleep on the same side as the tube.    Secure the tube and bulb inside your clothing with a safety pin. This helps keep the tube from being pulled out.     Keep the bulb compressed at all times, except when you empty it.    Empty your drain at least twice a day. Empty it more often if the drain is full.   o Lift the opening of the drain.  o Drain the fluid into a measuring cup.  o Record the amount of fluid each time you empty. Share the information with your doctor at your follow-up visit.   o Squeeze the bulb with your hands  until you hear air coming out of the bulb.  o Close the opening.     Tape plastic wrap over the bandage and tube site when you shower.      Stripping  the tube helps keep blood clots from blocking the tube.                         ONLY DO THIS IF YOUR MD INSTRUCTED YOU TO DO SO!  o Hold the tubing where it leaves the skin with one hand. This keeps it from pulling on the skin.  o Pinch the tubing with the thumb and first finger of your other hand.   o Slowly and firmly pull your thumb and first finger down the tube (squeezing the tube between your fingers). Keep squeezing the tube as you run your fingers towards the bulb. If the pulling hurts or feels like it is coming out of the skin, STOP. Begin again more gently.  o Let go of the tubing with both hands. If the tube is still blocked, repeat these steps three or four times. Make sure that the bulb is compressed so it creates suction.  When to call your doctor:    New or increased pain around the tube    Redness, warmth, or swelling around the incision or tube    Drainage that is foul smelling    Vomiting    Fever over 101 F degrees    Fluid leaking around the tube    Incision seems not to be healing    Stitches become loose    The tube falls out    Drainage that changes from light pick to dark red    A sudden increase or decrease in the amount of drainage (over 30 ml).  Your drainage record:  Date Time Bulb 1: Amount of drainage (ml or cc) Bulb 2: Amount of drainage (ml or cc) Notes                                                                                            Rev. 4/2014  ON-Q  C-bloc Continuous Nerve Block Discharge Instructions  The Nerve Block:      Your anesthesiologist performed a nerve block (a procedure that blocks pain to only a specific area) by inserting a small tube in your body.  This tube is connected to a pump that will help control your pain.    The pump is shaped like a balloon and is filled with medicine that causes numbness or loss of  sensation to help control your pain around the incision or site of injury.  The pain pump DOES NOT contain controlled substances.      The medicine in the pump may alter your ability to feel changes in temperature or pressure. Your doctor will tell you if any special precautions apply to you.       The Pump      DO NOT SQUEEZE THE PUMP.     The pump delivers medicine at a very slow rate.    You will NOT see the medicine moving through the tubing.    As the medicine is delivered, the pump ball will slowly become smaller.    It may take a day or so before you notice a change in the size and look of the pump.    Depending on the size of your pump, it may take 2 - 5 days to give all the medicine.    The middle part of the pump may look like an apple core when empty.  Managing Your Pain  The Medicine and Infusion Rate:   4 ml per hour           The continuous nerve block infusion may not block all of the pain from your surgery so it is important that you take the pain medicines prescribed by your surgeon if you need them.      If you continue to have difficulty with your pain control, please page or call the anesthesiologist.  Caring for Your Pump at Home    Wear the pump on the outside of clothing - away from your skin and cold therapy (ice packs).  The delivery rate is accurate only at room temperature.    Make sure the white clamp on the tubing remains open (moves freely on the tubing).    Make sure there are no kinks in the tubing.    Do not tape or cover up the filter.    Protect the pump from sunlight and heat.    When sleeping:   o Do not place the pump underneath the bed covers where the pump may become too warm.  o Do not place the pump on the floor or hang the pump on a bed post as these situations may cause the tubing to get tangled and get pulled out.    Bathing/Showering:    o We recommend taking sponge baths until the pump is removed.  o It is important to not get the area where the tube enters your body  wet.    It is normal for a small amount of fluid to leak from the hole in your body where the tube is inserted.  If this occurs, reinforce the bandage with another bandage.  The Infusion    Do not turn the infusion off unless your anesthesiologist has told you to do so.    Do not change the flow rate of the infusion unless your anesthesiologist told you to do so.  Changing the flow rate without your doctor s instruction may result in the wrong dose of medicine, which could cause serious injury.    If your anesthesiologist told you to change the rate of your infusion:  o Flip open the clear cover.  o Turn the white key on the select-a-flow dial to the instructed rate.  (The rate of the infusion should line up with the black arrow at the top of the controller.)    o Listen for the click when you move the dial.  Removing the Tubing    When the pump is empty or if you have been told to stop the infusion you can remove the tubing.  Follow these steps:  o Wash your hands.  o Clamp the tubing (squeeze the white clamp until you hear or feel a click).  o Remove the clear dressing that covers the tubing.  o Grasp the tubing close to the skin and gently pull.  If you meet resistance, stop pulling and page or call your anesthesiologist.  o DO NOT cut or forcefully remove the tubing.  o After removal, check the end of the tubing for a dark tip.  If you do not see a dark tip, page or call your anesthesiologist.  o Apply firm pressure over the site until oozing stops.  Wash the area with soap and water, dry with a clean towel and then cover with a bandage.      The pump is not reusable. Dispose of it in the trash and wash your hands.   Troubleshooting    Tubing Comes Out From Skin:  If the tube accidentally comes out, check the end of the tube for a dark tip.    If you see a dark tip simply discard it and use the pain pills prescribed to you by your surgeon.    If you don t see a dark tip, page or call the  anesthesiologist.    Tubing Disconnection:  If the tubing accidentally becomes disconnected from the pump, DO NOT reconnect the pump to the tubing.  It may have been contaminated with germs.  Close the tubing clamp and immediately page or call your anesthesiologist.    Fluid Leaking:  If enough fluid is leaking from the pump or the tubing outside your body to soak through the dressing, close the white clamp on the tubing and page or call your anesthesiologist.    Immediately report the following to your anesthesiologist:    Redness, warmth, swelling, or tenderness at the site the tubing was inserted    Increase in pain    Fever, chills, sweats    Bowel or bladder changes    Difficulty breathing    Dizziness, lightheadedness    Blurred vision    Ringing or buzzing in your ears    Metal taste in your mouth    Numbness and/or tingling around your mouth, fingers or toes    Drowsiness    Confusion    Trouble removing the tubing    Dark tip is not present when tubing is removed         Notifying your Anesthesiologist  o Page:  Dial [phone], then enter [phone].  You will be prompted to enter your phone number and then the # sign.  The anesthesiologist will call you back.  o Call:  Dial [phone].  Ask to speak to the anesthesiologist on call for the [facility].

## 2018-01-04 NOTE — ANESTHESIA POSTPROCEDURE EVALUATION
Patient: Inderjit Rider    Procedure(s):  Bilateral Subcutaneous Mastectomy With Nipple Grafts. OnQ - Wound Class: I-Clean    Diagnosis:Transgender/Gender Dysphoria  Diagnosis Additional Information: No value filed.    Anesthesia Type:  General, ETT    Note:  Anesthesia Post Evaluation    Patient location during evaluation: PACU  Patient participation: Able to fully participate in evaluation  Level of consciousness: awake and alert  Pain management: adequate  Airway patency: patent  Cardiovascular status: acceptable  Respiratory status: acceptable  Hydration status: balanced  PONV: none     Anesthetic complications: None          Last vitals:  Vitals:    01/03/18 1930 01/03/18 1945 01/03/18 2000   BP: 119/76  108/64   Resp: 17 12 14   Temp:      SpO2: 100% 99% 99%         Electronically Signed By: Gloria Roblero MD  January 3, 2018  8:33 PM

## 2018-01-05 NOTE — OP NOTE
DATE OF SERVICE:  01/03/2018      ATTENDING:  Giulia Woods MD      RESIDENT SURGEON:  Shawnee Kennedy MD (Teri)      PREOPERATIVE DIAGNOSIS:  Gender dysphoria, female to male transgender.      POSTOPERATIVE DIAGNOSIS:  Gender dysphoria, female to male transgender.      PROCEDURE:  Bilateral subcutaneous mastectomies with nipple grafts and On-Q catheter placement.      ANESTHESIA:  GET.      ESTIMATED BLOOD LOSS:  Less than 100 mL.      INTRAVENOUS FLUIDS:  2000 mL.      URINE OUTPUT:  100 mL.      COUNTS:  Correct.      COMPLICATIONS:  None.      DRAINS:  SMITHA x2.      SPECIMEN:  Right breast 656 grams, left breast 560 grams.      INDICATIONS:  Inderjit Rider is a 22-year-old biological female transitioning to male.  He met WPATH criteria for top surgery.  Due to his large breast volume, asymmetries and ptosis, he would require double incisions with nipple grafts.      PROCEDURE:  The patient was seen in the preoperative waiting area.  The operative sites were marked.  This included sternal notch, sternal midline, IMF incisions with extensions for possible dog ears, and a vertical line through the nipple-areolar complex as well as the transverse line transposed onto the anterior chest from the mid humeral level.  Informed consent was obtained after reviewing the possible risks and complications including but not limited to the following:  Infection, bleeding, hematoma, seroma formation, poor healing including dehiscence or hypertrophic scarring, altered sensation of the chest wall, either hypo or hypersensitivity, residual deformities, asymmetries or need for further surgery, possible injury to the intrathoracic or intra-axillary structures as well as surrounding neurovascular and musculoskeletal structures, and anesthetic risks such as DVT, PE and cardiopulmonary arrest.  The patient was then brought to the operating room on a stretcher and placed supine on the OR table.  After general anesthesia was  administered, Connor was placed.  The patient already had sequential compression devices on his lower extremities prior to induction.  Additional pillows were placed under the legs and padding with safety straps across the thighs and forelegs.  Arms were secured to arm boards at 90 degrees from the table using Ace wraps.  Chest-breast area was shaved and then prepped and draped in the usual sterile fashion using ChloraPrep.  A timeout was taken and proper patient and procedure were identified.  We then made our inframammary fold incisions using either a scalpel and [employer] [facility] cautery or the peak plasma Harmonic blade.  Approximately 2-3 cm of subcu fat was left along the IMF incision before beveling down to the pec fascia.  The breast mound was then elevated off the pec fascia and this allowed us to displace it inferiorly and tk where it overlapped with the IMF incision.  The nipple areolar complexes  were then harvested sharply with #10 blade and kept on the backtable wrapped in normal saline moistened gauze and marked per side.  The breast mound was then amputated at the level of the skin flap markings and this was kept on the backtable and ultimately weighed before being sent to pathology for histologic examination.  Some additional tailoring of the skin flaps was done to achieve better symmetry.  Our incisions were temporarily skin stapled and the patient was put into a sitting position to adjust for any further asymmetries.  We wanted to have a just mildly curvilinear incision certainly medially and laterally.  We also made markings to eliminate dog ears laterally.  Additional thinning and trimming was done and this extra tissue was also added to the specimens.  When we were happy with our symmetry with regard to contour as well as shape of the incisions, etc., the dissection pockets were irrigated with triple antibiotic solution.  Hemostasis was achieved with cautery.  Of note, this patient has very prominent  lower manubrium-upper sternum and very prominent medial costochondral junction in that region.  His pec muscles are tapered if you will kind of following more carinatum kind of thoracic configuration.  We tried to do as much as we could with regards to lateral and anterior shaping of the skin flaps and the dog ear area.  The #15 round SMITHA drains were introduced through separate stab wound incisions laterally and secured with 3-0 nylon suture.  This was draped along the inferior aspect of the dissection pocket.  Of note, a little bit of the inferior subcu fat was excised from the left medial aspect.  Dual 10 inch On-Q catheters were introduced percutaneously from the epigastrium and draped along the superior aspect of the dissection pocket.  These were secured with benzoin and Tegaderm.  Definitive closure was then achieved with 3-0 Vicryl deep dermal buried sutures followed by 4-0 Vicryl running subcuticular suture for the skin.  Of note, in an effort to try and shape a little bit more squared transition from anterior to lateral chest, we did use a couple deep 2-0 Vicryl sutures to pull together some of the retracting receding breast tissue from the superior flap with the excess subcu tissue along the IMF.  This did help to improve the contour somewhat.  Once we were happy with our closure, we made 1.5 cm in diameter nipple templates and the patient was put into a sitting position.  This allowed us to identify the most aesthetic placement for the nipple grafts.  These areas were deepithelialized sharply with #15 blades.  Hemostasis was achieved with cautery very judiciously, otherwise direct pressure.  The nipple grafts that were harvested previously were thinned aggressively with iris scissors and then trimmed of excess areola to fit the recipient sites.  These were then anchored with 5-0 fast absorbing gut interrupted and running cuticular sutures.  Pie crusting was done with an 18-gauge needle and then the [place]  of the nipple was also anchored with the 5-0 fast absorbing.  Bolster dressings were applied which consisted of Xeroform sheets with a couple antibiotic-soaked cotton balls.  These were then held in place with skin staples and 2-0 silk tie-overs.  Dermabond Prineo was applied to our incisions.  We did not have to join the incisions in the midline.  Drains were trimmed and put to bulb suction.  Dressings of ABD pads for drain sponges and a couple of Kerlix rolls unfurled across the anterior chest for padding.  A double long 6-inch Ace wrap was wrapped circumferentially around the patient's chest with the use of a sliding board behind the back.  The On-Q pump was attached to the catheters.  This consisted of 550 mL of 0.2% ropivacaine to be delivered at 2 mL per hour per catheter for the next 5 days.  The patient was extubated.  The Connor was left in place until PACU.  He was transferred to a stretcher and taken to the recovery room in satisfactory condition having tolerated the procedure without difficulty or complication.  Our breast specimens were weighed off the backtable and we got 656 grams on the right and 560 grams on the left.  These were sent to pathology for histologic examination.         ЮЛИЯ ACOSTA MD             D: 2018 12:54   T: 2018 02:23   MT:       Name:     WALDO CERVANTES   MRN:      [id number]        Account:        [id number]   :      1995           Procedure Date: 2018      Document: [id number]

## 2018-01-07 ENCOUNTER — HEALTH MAINTENANCE LETTER (OUTPATIENT)
Age: 23
End: 2018-01-07

## 2018-01-09 ENCOUNTER — OFFICE VISIT (OUTPATIENT)
Dept: PLASTIC SURGERY | Facility: CLINIC | Age: 23
End: 2018-01-09
Payer: COMMERCIAL

## 2018-01-09 VITALS
DIASTOLIC BLOOD PRESSURE: 75 MMHG | WEIGHT: 161 LBS | BODY MASS INDEX: 26.82 KG/M2 | HEART RATE: 90 BPM | OXYGEN SATURATION: 100 % | SYSTOLIC BLOOD PRESSURE: 130 MMHG | HEIGHT: 65 IN

## 2018-01-09 DIAGNOSIS — Z90.13 S/P BILATERAL MASTECTOMY: ICD-10-CM

## 2018-01-09 DIAGNOSIS — Z98.890 POSTOPERATIVE STATE: Primary | ICD-10-CM

## 2018-01-09 LAB — COPATH REPORT: NORMAL

## 2018-01-09 ASSESSMENT — PAIN SCALES - GENERAL: PAINLEVEL: NO PAIN (0)

## 2018-01-09 NOTE — NURSING NOTE
"Chief Complaint   Patient presents with     Surgical Followup     1 week post op        Vitals:    01/09/18 0956   BP: 130/75   Pulse: 90   SpO2: 100%   Weight: 161 lb   Height: 5' 5\"       Body mass index is 26.79 kg/(m^2).      Berry QURESHI                  "

## 2018-01-09 NOTE — LETTER
1/9/2018       RE: Inderjit Rider  [address]   [place] [address]     Dear Colleague,    Thank you for referring your patient, Inderjit Rider, to the [facility] PLASTIC AND RECONSTRUCTIVE SURGERY at [facility]. Please see a copy of my visit note below.    PLASTICS POSTOP    This 22 year old trans male is 1 week s/p bilateral SQ mastectomies with nipple grafts. He experienced some pukiness and pruritus that was treated with his meds, but otherwise he did pretty well with his pain control and has enough pain meds for now. He also felt that the OnQ pump was helpful.     His SMITHA drains are putting out sufficiently low drainage that they were removed today without difficulty.     On exam, he has good contour and symmetry of his skin flaps and incisions. They look a little low on his long trunk but we ll see how things heal. His nipple grafts are 100% take, although the right side looks a bit more lateral. The Prineo is still intact and there is minimal edema and ecchymosis.  Photos were taken with verbal consent.    He was shown how to do his nipple graft dressings and given supplies. He will continue his ACE wrap for the remainder of the week. He will continue his activity limitations for the next several weeks. We will see him back at 2 months postop.  Overall, I think he is happy with his outcome.      Again, thank you for allowing me to participate in the care of your patient.      Sincerely,    Giulia Woods MD

## 2018-01-09 NOTE — MR AVS SNAPSHOT
After Visit Summary   1/9/2018    Inderjit Rider    MRN: [id number]           Patient Information     Date Of Birth          1995        Visit Information        Provider Department      1/9/2018 10:00 AM Giulia Woods MD [facility] Plastic and Reconstructive Surgery        Care Instructions    Post Transgender Mastectomy Instructions    May shower with water spray to your back for the first week.    Change dressing after showering or once daily.  Care of your new nipples:  1. Carefully remove the old dressing making sure it s not sticking to or lifting up the grafts. (if you feel it is sticking you can get it slightly damp by spraying the microklenz on the gauze to help it lift up).  2. Liberally spray a 4x4 gauze with microklenz spray then gently pat nipples with the wet gauze and allow to air dry.  Do Not Rub!  3. Cut a piece of adaptic (curity non adherent or Vaseline coated dressing) into 4 pieces.  (remember to save the leftover 2 pieces in a ziplock bag).  4. Place one piece of the cut adaptic over each nipple.  5. Fold a 2x2 gauze in half and place on top of the adaptic.  6. Carefully place the tegaderm protective cover over the top.  7. Change the dressing for the next 7 days.  8. Ace wrap for the next 7 days as well. (this is to help with any swelling).  You can add an extra 3 more days of dressing changes if you choose but you only need to cover the nipples for 7 days.     The glue strip over your incision will start to peel up and fall off after about 2 weeks but if after 3 weeks the glue strip is still in place you may need to help it come off in the shower.      Post Surgery Nipple FAQ s    1) Once I'm no longer using nipple dressings or the Ace wrap, do I just not put anything on my nipples?     No you don't have to.  Remember it is ok if each side is behaving differently in healing.     Should I be moisturizing them with anything, or is it better not to?      Doesn't  matter if you do or don't, but you certainly can.     2) When can I let the spray from my shower head hit my chest directly?     When you are done with the nipple dressings you may shower like you normally do.    And should I be washing my chest with soap, or just rinsing it?     You may wash with soap but remember soap can dry the skin and we don t recommend daily soap for your skin except for critical areas. You may wash with soap but treat the nipples gently, so no washcloth or anything else rough or rubbing for 8 weeks     3) When can I start wearing shirts that pull over my head instead of buttoning/zipping in the front?     Whenever it's is not sore to do so, probably within 3-4 weeks.     4) I forget what you said about how much I can lift when.     5 pound lifting restriction for a minimum  of 4 to 6 weeks.  Let your body be your guide, increase in 5 lb increments weekly.   If frequent 50 pound lifting is typical, you can resume this again at 8 weeks from surgery     5) When can I start reaching my arms over my head?     At around 3 weeks, gently     6) When can I start swimming again?     You can swim or immerse your incision area under water fully around 6-8 weeks     7) I've been sleeping on my back, but I'm wondering when it's okay to sleep on my side.      You can start sleeping on your side at around 3-4 weeks after surgery as tolerated     8) When can I start to massage my scars?     Don t start until at least 3 weeks post op but then you can start gently massaging your scars whenever you like.     9) What will reduce the scar?     Scars will lighten with time, approximately in one year. Sun exposure however will darken them so if you are concerned use sunscreen on the scars. Other scar reducing products are unproven but okay to try if you want.        Scarring depends on genetics, tension on the tissues during activities.    There are various options none proven and none covered by insurance.    Silicone strips - oils - vitamin E - Mederma - Frankincense, etc.      10) When are the nipple done sloughing?    Old nipple skin will peel off when new graft underneath has healed usually 2-3 weeks post op.     11) What happens if there is drainage?    Keep clean and dry cover with gauze and bandaid. If the nipples becomes very red and warm call our office    12) You can resume normal activities at 6-8 weeks.     If an activity causes pain don't do it and wait a few days before trying again. Let your body be your guide, increase activities in increments.     Always call the nurse at [phone] or [phone] if you are concerned.             Follow-ups after your visit        Your next 10 appointments already scheduled     Mar 06, 2018  4:30 PM CST   (Arrive by 4:15 PM)   Post-Op with Giulia Woods MD   [facility] Plastic and Reconstructive Surgery ([facility])    [address] [place] [address]  [address] [address] [phone]              Who to contact     Please call your clinic at [phone] to:    Ask questions about your health    Make or cancel appointments    Discuss your medicines    Learn about your test results    Speak to your doctor   If you have compliments or concerns about an experience at your clinic, or if you wish to file a complaint, please contact [facility] Patient Relations at [phone] or email us at [email or web address]         Additional Information About Your Visit        [employer]hart Information     [employer] gives you secure access to your electronic health record. If you see a primary care provider, you can also send messages to your care team and make appointments. If you have questions, please call your primary care clinic.  If you do not have a primary care provider, please call [phone] and they will assist you.      [employer] is an electronic gateway that provides easy, online access to your  "medical records. With [employer], you can request a clinic appointment, read your test results, renew a prescription or communicate with your care team.     To access your existing account, please contact your [facility] or call [phone] for assistance.        Care EveryWhere ID     This is your Care EveryWhere ID. This could be used by other organizations to access your [place] medical records  [id number]        Your Vitals Were     Pulse Height Pulse Oximetry BMI (Body Mass Index)          90 5' 5\" 100% 26.79 kg/m2         Blood Pressure from Last 3 Encounters:   01/09/18 130/75   01/03/18 130/75   03/28/17 119/65    Weight from Last 3 Encounters:   01/09/18 161 lb   01/03/18 161 lb 6 oz   03/28/17 155 lb 3.2 oz (53 %)*     * Growth percentiles are based on [facility][note 3] 2-20 Years data.              Today, you had the following     No orders found for display       Primary Care Provider Office Phone # Fax #    [facility] [phone] [address] [place] [address]        Equal Access to Services     ROB GASTON : Hadii diann lee Soshirley, waaxda luchu, qaybta kaalmiah marinelli, carey chicas. So [place] [phone].    ATENCIÓN: Si habla español, tiene a courtney disposición servicios gratuitos de asistencia lingüística. [facility] [phone].    We comply with applicable federal civil rights laws and [place] laws. We do not discriminate on the basis of race, color, national origin, age, disability, sex, sexual orientation, or gender identity.            Thank you!     Thank you for choosing [facility] PLASTIC AND RECONSTRUCTIVE SURGERY  for your care. Our goal is always to provide you with excellent care. Hearing back from our patients is one way we can continue to improve our services. Please take a few minutes to complete the written survey that you may receive in the mail after your visit with us. Thank you!      "        Your Updated Medication List - Protect others around you: Learn how to safely use, store and throw away your medicines at www.disposemymeds.org.          This list is accurate as of: 1/9/18 10:12 AM.  Always use your most recent med list.                   Brand Name Dispense Instructions for use Diagnosis    acetaminophen 325 MG tablet    TYLENOL    100 tablet    Take 2 tablets (650 mg) by mouth every 4 hours as needed for mild pain    Gender dysphoria in adult       azithromycin 250 MG tablet    ZITHROMAX    6 tablet    Two tablets first day, then one tablet daily for four days.    Gender dysphoria in adult       hydrOXYzine 25 MG tablet    ATARAX    60 tablet    Take 1-2 tablets (25-50 mg) by mouth every 6 hours as needed for itching Do not use benadryl while taking this medication    Gender dysphoria in adult       LEXAPRO PO      Take 30 mg by mouth At Bedtime        ondansetron 4 MG ODT tab    ZOFRAN ODT    20 tablet    Take 1-2 tablets (4-8 mg) by mouth every 8 hours as needed for nausea    Gender dysphoria in adult       oxyCODONE IR 5 MG tablet    ROXICODONE    50 tablet    Take 1-2 tablets (5-10 mg) by mouth every 4 hours as needed for pain maximum 12 tablet(s) per day    Gender dysphoria in adult       senna-docusate 8.6-50 MG per tablet    SENOKOT-S;PERICOLACE    60 tablet    Take 1 tablet by mouth 2 times daily Take while using narcotic pain medication to prevent constipation    Gender dysphoria in adult       testosterone cypionate 200 MG/ML injection    DEPOTESTOTERONE     Inject 0.4 mLs into the muscle once a week

## 2018-01-09 NOTE — PATIENT INSTRUCTIONS
Post Transgender Mastectomy Instructions    May shower with water spray to your back for the first week.    Change dressing after showering or once daily.  Care of your new nipples:  1. Carefully remove the old dressing making sure it s not sticking to or lifting up the grafts. (if you feel it is sticking you can get it slightly damp by spraying the microklenz on the gauze to help it lift up).  2. Liberally spray a 4x4 gauze with microklenz spray then gently pat nipples with the wet gauze and allow to air dry.  Do Not Rub!  3. Cut a piece of adaptic (curity non adherent or Vaseline coated dressing) into 4 pieces.  (remember to save the leftover 2 pieces in a ziplock bag).  4. Place one piece of the cut adaptic over each nipple.  5. Fold a 2x2 gauze in half and place on top of the adaptic.  6. Carefully place the tegaderm protective cover over the top.  7. Change the dressing for the next 7 days.  8. Ace wrap for the next 7 days as well. (this is to help with any swelling).  You can add an extra 3 more days of dressing changes if you choose but you only need to cover the nipples for 7 days.     The glue strip over your incision will start to peel up and fall off after about 2 weeks but if after 3 weeks the glue strip is still in place you may need to help it come off in the shower.      Post Surgery Nipple FAQ s    1) Once I'm no longer using nipple dressings or the Ace wrap, do I just not put anything on my nipples?     No you don't have to.  Remember it is ok if each side is behaving differently in healing.     Should I be moisturizing them with anything, or is it better not to?      Doesn't matter if you do or don't, but you certainly can.     2) When can I let the spray from my shower head hit my chest directly?     When you are done with the nipple dressings you may shower like you normally do.    And should I be washing my chest with soap, or just rinsing it?     You may wash with soap but remember soap can dry  the skin and we don t recommend daily soap for your skin except for critical areas. You may wash with soap but treat the nipples gently, so no washcloth or anything else rough or rubbing for 8 weeks     3) When can I start wearing shirts that pull over my head instead of buttoning/zipping in the front?     Whenever it's is not sore to do so, probably within 3-4 weeks.     4) I forget what you said about how much I can lift when.     5 pound lifting restriction for a minimum  of 4 to 6 weeks.  Let your body be your guide, increase in 5 lb increments weekly.   If frequent 50 pound lifting is typical, you can resume this again at 8 weeks from surgery     5) When can I start reaching my arms over my head?     At around 3 weeks, gently     6) When can I start swimming again?     You can swim or immerse your incision area under water fully around 6-8 weeks     7) I've been sleeping on my back, but I'm wondering when it's okay to sleep on my side.      You can start sleeping on your side at around 3-4 weeks after surgery as tolerated     8) When can I start to massage my scars?     Don t start until at least 3 weeks post op but then you can start gently massaging your scars whenever you like.     9) What will reduce the scar?     Scars will lighten with time, approximately in one year. Sun exposure however will darken them so if you are concerned use sunscreen on the scars. Other scar reducing products are unproven but okay to try if you want.        Scarring depends on genetics, tension on the tissues during activities.    There are various options none proven and none covered by insurance.   Silicone strips - oils - vitamin E - Mederma - Frankincense, etc.      10) When are the nipple done sloughing?    Old nipple skin will peel off when new graft underneath has healed usually 2-3 weeks post op.     11) What happens if there is drainage?    Keep clean and dry cover with gauze and bandaid. If the nipples becomes very red  and warm call our office    12) You can resume normal activities at 6-8 weeks.     If an activity causes pain don't do it and wait a few days before trying again. Let your body be your guide, increase activities in increments.     Always call the nurse at [phone] or [phone] if you are concerned.

## 2018-01-23 NOTE — PROGRESS NOTES
PLASTICS POSTOP    This 22 year old trans male is 1 week s/p bilateral SQ mastectomies with nipple grafts. He experienced some pukiness and pruritus that was treated with his meds, but otherwise he did pretty well with his pain control and has enough pain meds for now. He also felt that the OnQ pump was helpful.     His SMITHA drains are putting out sufficiently low drainage that they were removed today without difficulty.     On exam, he has good contour and symmetry of his skin flaps and incisions. They look a little low on his long trunk but we ll see how things heal. His nipple grafts are 100% take, although the right side looks a bit more lateral. The Prineo is still intact and there is minimal edema and ecchymosis. Photos were taken with verbal consent.    He was shown how to do his nipple graft dressings and given supplies. He will continue his ACE wrap for the remainder of the week. He will continue his activity limitations for the next several weeks. We will see him back at 2 months postop. Overall, I think he is happy with his outcome.

## 2018-03-06 ENCOUNTER — OFFICE VISIT (OUTPATIENT)
Dept: PLASTIC SURGERY | Facility: CLINIC | Age: 23
End: 2018-03-06
Payer: COMMERCIAL

## 2018-03-06 DIAGNOSIS — Z90.13 S/P BILATERAL MASTECTOMY: Primary | ICD-10-CM

## 2018-03-06 NOTE — LETTER
3/6/2018       RE: Inderjit Rider  [address]   [place] [address]     Dear Colleague,    Thank you for referring your patient, Inderjit Rider, to the [facility] PLASTIC AND RECONSTRUCTIVE SURGERY at [facility]. Please see a copy of my visit note below.    PLASTICS POSTOP FOLLOW UP    This 22 year old trans male is here for follow up after bilateral SQ mastectomies with nipple grafts on 1/3.  He has done fairly well during his recovery period and started working out last week at the gym, but he hasn't been lifting much more than 10-20 pounds.   He has not really been doing any massage for his scars to this point. He denies any problems with ROM.    On exam, he has pretty good symmetry and contour. The scars are healing well with just some slight hypertrophy and hyperemia. The scars are not symptomatic. The nipple grafts have great color that is coming in from the perimeter, and central projection is good. There is slight fullness laterally. There are striae above the nipple grafts. Early hair coming in centrally over sternum.  Photos taken with verbal permission.    A/P: good early healing. Will continue to remodel and mature. OK to start massaging scars with whatever oils desired. Can also use silicone strips to help hydrate if able. Mentioned the possibility of trialing frequency specific microcurrent for the scars and he indicated a willingness to participate.    We will follow up with Inderjit in a year from surgery as he is able. He is free to contact us at any time PRN.    Giulia Woods MD

## 2018-03-06 NOTE — MR AVS SNAPSHOT
After Visit Summary   3/6/2018    Inderjit Rider    MRN: [id number]           Patient Information     Date Of Birth          1995        Visit Information        Provider Department      3/6/2018 4:30 PM Giulia Woods MD [facility] Plastic [facility] Reconstructive Surgery        Today's Diagnoses     S/P bilateral mastectomy    -  1       Follow-ups after your visit        Who to contact     Please call your clinic at [phone] to:    Ask questions about your health    Make or cancel appointments    Discuss your medicines    Learn about your test results    Speak to your doctor            Additional Information About Your Visit        MyChart Information     [employer] gives you secure access to your electronic health record. If you see a primary care provider, you can also send messages to your care team and make appointments. If you have questions, please call your primary care clinic.  If you do not have a primary care provider, please call [phone] and they will assist you.      [employer] is an electronic gateway that provides easy, online access to your medical records. With [employer], you can request a clinic appointment, read your test results, renew a prescription or communicate with your care team.     To access your existing account, please contact your [facility] or call [phone] for assistance.        Care EveryWhere ID     This is your Care EveryWhere ID. This could be used by other organizations to access your [facility] medical records  [id number]         Blood Pressure from Last 3 Encounters:   01/09/18 130/75   01/03/18 130/75   03/28/17 119/65    Weight from Last 3 Encounters:   01/09/18 161 lb   01/03/18 161 lb 6 oz   03/28/17 155 lb 3.2 oz (53 %)*     * Growth percentiles are based on CDC 2-20 Years data.              Today, you had the following     No orders found for display       Primary Care Provider Office Phone # Fax #    [facility] [phone]       [address] [place] [address]        Equal Access to Services     ROB GASTON : Hadii aad ku hadaxelamish Etienne, jesusda spikemorris, myriam karamonda isis, carey moralesmichael chicas. So [facility] [phone].    ATENCIÓN: Si habla español, tiene a courtney disposición servicios gratuitos de asistencia lingüística. Llame al [phone].    We comply with applicable federal civil rights laws and [place] laws. We do not discriminate on the basis of race, color, national origin, age, disability, sex, sexual orientation, or gender identity.            Thank you!     Thank you for choosing [facility] PLASTIC AND RECONSTRUCTIVE SURGERY  for your care. Our goal is always to provide you with excellent care. Hearing back from our patients is one way we can continue to improve our services. Please take a few minutes to complete the written survey that you may receive in the mail after your visit with us. Thank you!             Your Updated Medication List - Protect others around you: Learn how to safely use, store and throw away your medicines at www.disposemymeds.org.          This list is accurate as of 3/6/18 11:59 PM.  Always use your most recent med list.                   Brand Name Dispense Instructions for use Diagnosis    acetaminophen 325 MG tablet    TYLENOL    100 tablet    Take 2 tablets (650 mg) by mouth every 4 hours as needed for mild pain    Gender dysphoria in adult       LEXAPRO PO      Take 30 mg by mouth At Bedtime        testosterone cypionate 200 MG/ML injection    DEPOTESTOTERONE     Inject 0.4 mLs into the muscle once a week

## 2018-03-25 NOTE — PROGRESS NOTES
PLASTICS POSTOP FOLLOW UP    This 22 year old trans male is here for follow up after bilateral SQ mastectomies with nipple grafts on 1/3.  He has done fairly well during his recovery period and started working out last week at the gym, but he hasn't been lifting much more than 10-20 pounds.   He has not really been doing any massage for his scars to this point. He denies any problems with ROM.    On exam, he has pretty good symmetry and contour. The scars are healing well with just some slight hypertrophy and hyperemia. The scars are not symptomatic. The nipple grafts have great color that is coming in from the perimeter, and central projection is good. There is slight fullness laterally. There are striae above the nipple grafts. Early hair coming in centrally over sternum.  Photos taken with verbal permission.    A/P: good early healing. Will continue to remodel and mature. OK to start massaging scars with whatever oils desired. Can also use silicone strips to help hydrate if able. Mentioned the possibility of trialing frequency specific microcurrent for the scars and he indicated a willingness to participate.    We will follow up with Inderjit in a year from surgery as he is able. He is free to contact us at any time PRN.

## 2019-03-27 ENCOUNTER — HOSPITAL ENCOUNTER (EMERGENCY)
Facility: CLINIC | Age: 24
Discharge: HOME | End: 2019-03-27
Attending: EMERGENCY MEDICINE | Admitting: EMERGENCY MEDICINE
Payer: OTHER MISCELLANEOUS

## 2019-03-27 ENCOUNTER — OFFICE VISIT (OUTPATIENT)
Dept: URGENT CARE | Facility: URGENT CARE | Age: 24
End: 2019-03-27

## 2019-03-27 VITALS
TEMPERATURE: 98 F | HEART RATE: 56 BPM | DIASTOLIC BLOOD PRESSURE: 73 MMHG | SYSTOLIC BLOOD PRESSURE: 112 MMHG | OXYGEN SATURATION: 98 %

## 2019-03-27 VITALS
OXYGEN SATURATION: 100 % | HEIGHT: 65 IN | SYSTOLIC BLOOD PRESSURE: 112 MMHG | WEIGHT: 185 LBS | DIASTOLIC BLOOD PRESSURE: 99 MMHG | TEMPERATURE: 98.7 F | HEART RATE: 79 BPM | RESPIRATION RATE: 13 BRPM | BODY MASS INDEX: 30.82 KG/M2
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
## 2019-03-27 DIAGNOSIS — S16.1XXA CERVICAL STRAIN, INITIAL ENCOUNTER: ICD-10-CM

## 2019-03-27 DIAGNOSIS — S09.90XA CLOSED HEAD INJURY, INITIAL ENCOUNTER: ICD-10-CM

## 2019-03-27 PROCEDURE — 99283 EMERGENCY DEPT VISIT LOW MDM: CPT

## 2019-03-27 PROCEDURE — 25000132 ZZH RX MED GY IP 250 OP 250 PS 637: Performed by: EMERGENCY MEDICINE

## 2019-03-27 PROCEDURE — 25000131 ZZH RX MED GY IP 250 OP 636 PS 637: Performed by: EMERGENCY MEDICINE

## 2019-03-27 RX ORDER — ONDANSETRON 4 MG/1
4 TABLET, ORALLY DISINTEGRATING ORAL ONCE
Status: COMPLETED | OUTPATIENT
Start: 2019-03-27 | End: 2019-03-27

## 2019-03-27 RX ORDER — ACETAMINOPHEN 500 MG
1000 TABLET ORAL ONCE
Status: COMPLETED | OUTPATIENT
Start: 2019-03-27 | End: 2019-03-27

## 2019-03-27 RX ORDER — ONDANSETRON 4 MG/1
4 TABLET, ORALLY DISINTEGRATING ORAL EVERY 8 HOURS PRN
Qty: 10 TABLET | Refills: 0 | Status: SHIPPED | OUTPATIENT
Start: 2019-03-27 | End: 2019-03-30

## 2019-03-27 RX ADMIN — ACETAMINOPHEN 1000 MG: 500 TABLET, FILM COATED ORAL at 21:20

## 2019-03-27 RX ADMIN — ONDANSETRON 4 MG: 4 TABLET, ORALLY DISINTEGRATING ORAL at 21:20

## 2019-03-27 ASSESSMENT — ENCOUNTER SYMPTOMS
DIZZINESS: 1
VOMITING: 0
NAUSEA: 1
NECK PAIN: 1
NUMBNESS: 0
BACK PAIN: 1
HEADACHES: 1

## 2019-03-27 ASSESSMENT — MIFFLIN-ST. JEOR: SCORE: 1771.03

## 2019-03-27 NOTE — ED AVS SNAPSHOT
Emergency Department  [address] [place] [address]  Phone:  [phone]  Fax:  [phone]                                    Inderjit Rider   MRN: [id number]    Department:   Emergency Department   Date of Visit:  3/27/2019           After Visit Summary Signature Page    I have received my discharge instructions, and my questions have been answered. I have discussed any challenges I see with this plan with the nurse or doctor.    ..........................................................................................................................................  Patient/Patient Representative Signature      ..........................................................................................................................................  Patient Representative Print Name and Relationship to Patient    ..................................................               ................................................  Date                                   Time    ..........................................................................................................................................  Reviewed by Signature/Title    ...................................................              ..............................................  Date                                               Time          22EPIC Rev 08/18

## 2019-03-27 NOTE — LETTER
March 27, 2019      To Whom It May Concern:      Inderjit Rider was seen in our Emergency Department today, 03/27/19.  I expect his condition to improve over the next 2-3 days.  He may return to work when improved.    Sincerely,        Jimena Sylvester RN

## 2019-03-28 NOTE — ED PROVIDER NOTES
History     Chief Complaint:  Fall    The history is provided by the patient.      Inderjit Rider is a 23 year old male with a history of anxiety and depression who presents to the emergency department with his fiancee for evaluation after a fall. 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 The patient did not lose consciousness. He states that he worked the rest of the shift, but endorsed increasing nausea, headache, blurry vision and dizziness prompting him to seek further evaluation at urgent care, who sent him here for CT imaging. Here, the patient complains headache and increasing blurry vision, dizziness and nausea in addition to neck pain and upper back pain. He denies any vomiting, tongue biting or dental pains. Patient denies any numbness or tingling in his extremities.     Allergies:  Adhesive   Iodine    Medications:    Tylenol  Lexapro  Depotestosterone    Past Medical History:    Anxiety  Depression  Insomnia  Scoliosis    Past Surgical History:    Dental Extraction  Tonsillectomy  Transgender Mastectomy: Bilateral     Family History:    Depression  TIA  Stroke  Diabetes  Hypertension  Hyperlipidemia  Bipolar Disorder  Heart Disease: Maternal Grandfather    Social History:  Marital Status:  Single [1]  Tobacco Use: No  Alcohol Use: No    Review of Systems   HENT: Negative for dental problem.    Eyes: Positive for visual disturbance.   Gastrointestinal: Positive for nausea. Negative for vomiting.   Musculoskeletal: Positive for back pain and neck pain.   Neurological: Positive for dizziness and headaches. Negative for syncope and numbness.   All other systems reviewed and are negative.      Physical Exam     Patient Vitals for the past 24 hrs:   BP Temp Temp src Pulse Heart Rate Resp SpO2 Height Weight   03/27/19 2105 (!) 112/99 -- -- 79 79 13 100 % -- --   03/27/19 2045 123/63  "98.7  F (37.1  C) Oral 83 -- 16 99 % 1.651 m (5' 5\") 83.9 kg (185 lb)       Physical Exam    Physical Exam   Constitutional:  Patient is oriented to person, place, and time. They appear well-developed and well-nourished. No distress.    HENT:    Atraumatic.   Mouth/Throat:   Oropharynx is clear and moist.   Eyes:    Conjunctivae normal and EOM are normal. Pupils are equal, round, and reactive to light.   Neck:    Normal range of motion. Generalized tenderness. No focal vertebral tenderness.   Cardiovascular: Normal rate, regular rhythm and normal heart sounds.  Exam reveals no gallop and no friction rub.  No murmur heard.  Pulmonary/Chest:  Effort normal and breath sounds normal. Patient has no wheezes. Patient has no rales.   Abdominal:   Soft. Bowel sounds are normal. Patient exhibits no mass. There is no tenderness. There is no rebound and no guarding.   Musculoskeletal:  Normal range of motion. Patient exhibits no edema.   Neurological:   Patient is alert and oriented to person, place, and time. Patient has normal strength. No cranial nerve deficit or sensory deficit. GCS 15. NIHSS is 0.  Skin:   Skin is warm and dry. No rash noted. No erythema.   Psychiatric:   Patient has a normal mood and affect. Patient's behavior is normal. Judgment and thought content normal.     Emergency Department Course     Interventions:  2120 Zofran-ODT 4 mg, PO  2120 Tylenol 1000 mg, PO    Emergency Department Course:  2111 Nursing notes and vitals reviewed. I performed an exam of the patient as documented above. I informed the patient of the plan for discharge.     Medicine administered as documented above.      Findings and plan explained to the patient and significant other. Patient discharged home with instructions regarding supportive care, medications, and reasons to return. The importance of close follow-up was reviewed.     I personally answered all related questions prior to discharge.    Impression & Plan      Medical " Decision Making:  Inderjit Rider is a 23 year old male who presents for evaluation after a slip and fall on the ice with trauma to the head. This patient has a history and clinical exam consistent with concussion.  The differential diagnosis includes skull fracture, epidural hematoma, subdural hematoma, intracerebral hemorrhage, and traumatic subarachnoid hemorrhage; all of these are highly unlikely in this clinical setting.  By the [facility] head CT rules they do not warrant head CT imaging of the head and discussed risk/benefit ratio with patient in detail. Additionally his exam had no concerning findings for Cervical spine fracture. This appears to be more of a strain/sprain from the fall. C spine clinically cleared. Patient was given Zofran and Tylenol here in the ED and I recommended Tylenol at home. I will also write for Zofran at home as needed for his nausea. Return to ED for red flags (change in behavior, drowsiness, seizures, vomiting, etc) and gave concussion precautions for home.  I did stress importance of avoiding a second concussion while brain heals.  The patients head to toe trauma exam is otherwise negative for serious underlying disease of the head, neck, chest, abdomen, extremities, pelvis. Patient understands the plan for discharged and return precautions and is discharged home in stable condition.       Diagnosis:    ICD-10-CM    1. Closed head injury, initial encounter S09.90XA    2. Cervical strain, initial encounter S16.1XXA        Disposition:  discharged to home    Discharge Medications:     Medication List      Started    ondansetron 4 MG ODT tab  Commonly known as:  ZOFRAN ODT  4 mg, Oral, EVERY 8 HOURS PRN          Scribe Disclosure:  I, Dejan Silva, am serving as a scribe on 3/27/2019 at 9:07 PM to personally document services performed by Jill Rojas MD based on my observations and the provider's statements to me.     Dejan Silva  3/27/2019    EMERGENCY DEPARTMENT        Jill Rojas MD  03/27/19 1184

## 2019-03-28 NOTE — PROGRESS NOTES
Patient presents with head injury from fall at work.  Needs head CT, further work up.   Recommend ER visit.   Patient amenable and friend will help him get there.       TERESA

## 2019-03-28 NOTE — ED TRIAGE NOTES
Pt fell on the ice about 14:30 without LOC.  Complains of severe headache and nausea without vomiting.

## 2020-03-10 ENCOUNTER — HEALTH MAINTENANCE LETTER (OUTPATIENT)
Age: 25
End: 2020-03-10

## 2020-12-27 ENCOUNTER — HEALTH MAINTENANCE LETTER (OUTPATIENT)
Age: 25
End: 2020-12-27

## 2021-04-24 ENCOUNTER — HEALTH MAINTENANCE LETTER (OUTPATIENT)
Age: 26
End: 2021-04-24

## 2021-10-09 ENCOUNTER — HEALTH MAINTENANCE LETTER (OUTPATIENT)
Age: 26
End: 2021-10-09

## 2022-05-16 ENCOUNTER — HEALTH MAINTENANCE LETTER (OUTPATIENT)
Age: 27
End: 2022-05-16

## 2022-09-11 ENCOUNTER — HEALTH MAINTENANCE LETTER (OUTPATIENT)
Age: 27
End: 2022-09-11

## 2023-06-03 ENCOUNTER — HEALTH MAINTENANCE LETTER (OUTPATIENT)
Age: 28
End: 2023-06-03

## (undated) DEVICE — LIGHT HANDLE X2

## (undated) DEVICE — LINEN TOWEL PACK X5 5464

## (undated) DEVICE — BNDG ELASTIC 6"X5YDS UNSTERILE 6611-60

## (undated) DEVICE — SPONGE LAP 18X18" X8435

## (undated) DEVICE — SU VICRYL 3-0 FS-1 27" J442H

## (undated) DEVICE — ESU BLADE PEAK PLASMA 3.0S PS210-030S

## (undated) DEVICE — BLADE KNIFE SURG 15 371115

## (undated) DEVICE — SU VICRYL 2-0 CT-1 27" UND J259H

## (undated) DEVICE — LINEN ORTHO PACK 5446

## (undated) DEVICE — SOL WATER IRRIG 1000ML BOTTLE 2F7114

## (undated) DEVICE — DRAIN JACKSON PRATT 15FR ROUND SU130-1323

## (undated) DEVICE — TUBING SUCTION MEDI-VAC 1/4"X20' N620A

## (undated) DEVICE — BLADE KNIFE SURG 10 371110

## (undated) DEVICE — CATH TRAY FOLEY SURESTEP 16FR WDRAIN BAG STLK LATEX A300316A

## (undated) DEVICE — SU VICRYL 4-0 PS-1 18" UND J682G

## (undated) DEVICE — SU SILK 2-0 TIE 12X30" A305H

## (undated) DEVICE — SOL NACL 0.9% IRRIG 1000ML BOTTLE 2F7124

## (undated) DEVICE — DRSG ABDOMINAL 07 1/2X8" 7197D

## (undated) DEVICE — SYR BULB IRRIG 50ML LATEX FREE 0035280

## (undated) DEVICE — SU SILK 2-0 SH 30" K833H

## (undated) DEVICE — PEN MARKING SKIN W/LABELS 31145918

## (undated) DEVICE — SU ETHILON 3-0 FS-1 18" 663G

## (undated) DEVICE — GOWN XLG DISP 9545

## (undated) DEVICE — SOL ADH LIQUID BENZOIN SWAB 0.6ML C1544

## (undated) DEVICE — STPL SKIN 35W APPOSE 8886803712

## (undated) DEVICE — SU PLAIN FAST ABSORB 5-0 PC-1 18" 1915G

## (undated) DEVICE — SUCTION MANIFOLD DORNOCH ULTRA CART UL-CL500

## (undated) DEVICE — DRAIN JACKSON PRATT RESERVOIR 100ML SU130-1305

## (undated) DEVICE — GLOVE PROTEXIS MICRO 6.5  2D73PM65

## (undated) DEVICE — DRSG COTTON BALL 6PK LCB62

## (undated) DEVICE — DRSG XEROFORM 5X9" 8884431605

## (undated) DEVICE — PREP CHLORAPREP 26ML TINTED ORANGE  260815

## (undated) DEVICE — PAD ARMBOARD FOAM EGGCRATE COVIDEN 3114367

## (undated) DEVICE — DRSG KERLIX 4 1/2"X4YDS ROLL 6730

## (undated) DEVICE — PAD CHUX UNDERPAD 30X30"

## (undated) DEVICE — Device

## (undated) DEVICE — STRAP KNEE/BODY 31143004

## (undated) DEVICE — JELLY LUBRICATING SURGILUBE 2OZ TUBE

## (undated) DEVICE — ESU PENCIL W/SMOKE EVAC NEPTUNE STRYKER 0703-046-000

## (undated) DEVICE — BNDG ELASTIC 6" DBL LENGTH UNSTERILE 6611-16

## (undated) DEVICE — SU DERMABOND PRINEO CLR602US

## (undated) RX ORDER — FENTANYL CITRATE 50 UG/ML
INJECTION, SOLUTION INTRAMUSCULAR; INTRAVENOUS
Status: DISPENSED
Start: 2018-01-03

## (undated) RX ORDER — ONDANSETRON 2 MG/ML
INJECTION INTRAMUSCULAR; INTRAVENOUS
Status: DISPENSED
Start: 2018-01-03

## (undated) RX ORDER — CEFAZOLIN SODIUM 1 G/3ML
INJECTION, POWDER, FOR SOLUTION INTRAMUSCULAR; INTRAVENOUS
Status: DISPENSED
Start: 2018-01-03

## (undated) RX ORDER — OXYCODONE HYDROCHLORIDE 5 MG/1
TABLET ORAL
Status: DISPENSED
Start: 2018-01-03

## (undated) RX ORDER — HYDROMORPHONE HCL/0.9% NACL/PF 0.2MG/0.2
SYRINGE (ML) INTRAVENOUS
Status: DISPENSED
Start: 2018-01-03

## (undated) RX ORDER — DEXAMETHASONE SODIUM PHOSPHATE 4 MG/ML
INJECTION, SOLUTION INTRA-ARTICULAR; INTRALESIONAL; INTRAMUSCULAR; INTRAVENOUS; SOFT TISSUE
Status: DISPENSED
Start: 2018-01-03

## (undated) RX ORDER — EPHEDRINE SULFATE 50 MG/ML
INJECTION, SOLUTION INTRAMUSCULAR; INTRAVENOUS; SUBCUTANEOUS
Status: DISPENSED
Start: 2018-01-03

## (undated) RX ORDER — PROPOFOL 10 MG/ML
INJECTION, EMULSION INTRAVENOUS
Status: DISPENSED
Start: 2018-01-03

## (undated) RX ORDER — CEFAZOLIN SODIUM 2 G/100ML
INJECTION, SOLUTION INTRAVENOUS
Status: DISPENSED
Start: 2018-01-03